# Patient Record
Sex: FEMALE | Race: WHITE | NOT HISPANIC OR LATINO
[De-identification: names, ages, dates, MRNs, and addresses within clinical notes are randomized per-mention and may not be internally consistent; named-entity substitution may affect disease eponyms.]

---

## 2017-01-17 ENCOUNTER — APPOINTMENT (OUTPATIENT)
Dept: OTOLARYNGOLOGY | Facility: CLINIC | Age: 77
End: 2017-01-17

## 2017-01-17 VITALS
HEIGHT: 67 IN | BODY MASS INDEX: 21.35 KG/M2 | HEART RATE: 76 BPM | WEIGHT: 136 LBS | DIASTOLIC BLOOD PRESSURE: 76 MMHG | SYSTOLIC BLOOD PRESSURE: 134 MMHG

## 2017-04-10 ENCOUNTER — APPOINTMENT (OUTPATIENT)
Dept: OTOLARYNGOLOGY | Facility: CLINIC | Age: 77
End: 2017-04-10

## 2017-04-10 VITALS
HEART RATE: 53 BPM | DIASTOLIC BLOOD PRESSURE: 78 MMHG | BODY MASS INDEX: 21.66 KG/M2 | SYSTOLIC BLOOD PRESSURE: 118 MMHG | HEIGHT: 67 IN | WEIGHT: 138 LBS

## 2017-06-21 ENCOUNTER — APPOINTMENT (OUTPATIENT)
Dept: OPHTHALMOLOGY | Facility: CLINIC | Age: 77
End: 2017-06-21

## 2017-08-08 ENCOUNTER — APPOINTMENT (OUTPATIENT)
Dept: OTOLARYNGOLOGY | Facility: CLINIC | Age: 77
End: 2017-08-08
Payer: MEDICARE

## 2017-08-08 VITALS
BODY MASS INDEX: 21.66 KG/M2 | SYSTOLIC BLOOD PRESSURE: 110 MMHG | DIASTOLIC BLOOD PRESSURE: 70 MMHG | WEIGHT: 138 LBS | HEIGHT: 67 IN

## 2017-08-08 PROCEDURE — 99214 OFFICE O/P EST MOD 30 MIN: CPT | Mod: 25

## 2017-08-08 PROCEDURE — 31231 NASAL ENDOSCOPY DX: CPT

## 2017-08-08 RX ORDER — FUROSEMIDE 20 MG/1
20 TABLET ORAL
Qty: 30 | Refills: 0 | Status: ACTIVE | COMMUNITY
Start: 2017-08-07

## 2017-08-08 RX ORDER — APIXABAN 5 MG/1
5 TABLET, FILM COATED ORAL
Qty: 60 | Refills: 0 | Status: ACTIVE | COMMUNITY
Start: 2017-08-05

## 2017-08-08 RX ORDER — ZOLPIDEM TARTRATE 6.25 MG/1
6.25 TABLET, EXTENDED RELEASE ORAL
Qty: 14 | Refills: 0 | Status: ACTIVE | COMMUNITY
Start: 2017-06-22

## 2017-08-08 RX ORDER — NITROFURANTOIN (MONOHYDRATE/MACROCRYSTALS) 25; 75 MG/1; MG/1
100 CAPSULE ORAL
Qty: 10 | Refills: 0 | Status: ACTIVE | COMMUNITY
Start: 2017-03-03

## 2017-11-07 ENCOUNTER — APPOINTMENT (OUTPATIENT)
Dept: OTOLARYNGOLOGY | Facility: CLINIC | Age: 77
End: 2017-11-07

## 2017-12-20 ENCOUNTER — APPOINTMENT (OUTPATIENT)
Dept: OPHTHALMOLOGY | Facility: CLINIC | Age: 77
End: 2017-12-20

## 2018-02-13 ENCOUNTER — APPOINTMENT (OUTPATIENT)
Dept: OTOLARYNGOLOGY | Facility: CLINIC | Age: 78
End: 2018-02-13
Payer: MEDICARE

## 2018-02-13 PROCEDURE — 31231 NASAL ENDOSCOPY DX: CPT

## 2018-02-13 PROCEDURE — 99213 OFFICE O/P EST LOW 20 MIN: CPT | Mod: 25

## 2018-02-13 RX ORDER — DILTIAZEM HYDROCHLORIDE 120 MG/1
120 CAPSULE, EXTENDED RELEASE ORAL
Qty: 14 | Refills: 0 | Status: ACTIVE | COMMUNITY
Start: 2017-12-19

## 2018-02-13 RX ORDER — METOPROLOL SUCCINATE 200 MG/1
200 TABLET, EXTENDED RELEASE ORAL
Qty: 30 | Refills: 0 | Status: ACTIVE | COMMUNITY
Start: 2018-01-17

## 2018-02-13 RX ORDER — CEPHALEXIN 250 MG/1
250 CAPSULE ORAL
Qty: 10 | Refills: 0 | Status: ACTIVE | COMMUNITY
Start: 2018-02-05

## 2018-02-13 RX ORDER — METHENAMINE HIPPURATE 1 G/1
1 TABLET ORAL
Qty: 100 | Refills: 0 | Status: ACTIVE | COMMUNITY
Start: 2018-01-05

## 2018-02-13 RX ORDER — CIPROFLOXACIN HYDROCHLORIDE 250 MG/1
250 TABLET, FILM COATED ORAL
Qty: 14 | Refills: 0 | Status: ACTIVE | COMMUNITY
Start: 2017-12-22

## 2018-02-13 RX ORDER — ESCITALOPRAM OXALATE 5 MG/1
5 TABLET ORAL
Qty: 10 | Refills: 0 | Status: ACTIVE | COMMUNITY
Start: 2018-02-06

## 2018-02-13 RX ORDER — BUSPIRONE HYDROCHLORIDE 5 MG/1
5 TABLET ORAL
Qty: 42 | Refills: 0 | Status: ACTIVE | COMMUNITY
Start: 2017-12-19

## 2018-02-20 ENCOUNTER — APPOINTMENT (OUTPATIENT)
Dept: OTOLARYNGOLOGY | Facility: CLINIC | Age: 78
End: 2018-02-20

## 2018-03-16 ENCOUNTER — APPOINTMENT (OUTPATIENT)
Dept: OTOLARYNGOLOGY | Facility: CLINIC | Age: 78
End: 2018-03-16
Payer: MEDICARE

## 2018-03-16 VITALS
DIASTOLIC BLOOD PRESSURE: 78 MMHG | HEIGHT: 67 IN | HEART RATE: 78 BPM | BODY MASS INDEX: 21.66 KG/M2 | WEIGHT: 138 LBS | SYSTOLIC BLOOD PRESSURE: 128 MMHG

## 2018-03-16 PROCEDURE — 31231 NASAL ENDOSCOPY DX: CPT

## 2018-03-16 PROCEDURE — 99214 OFFICE O/P EST MOD 30 MIN: CPT | Mod: 25

## 2018-05-18 ENCOUNTER — APPOINTMENT (OUTPATIENT)
Dept: OTOLARYNGOLOGY | Facility: CLINIC | Age: 78
End: 2018-05-18
Payer: MEDICARE

## 2018-05-18 VITALS
BODY MASS INDEX: 21.66 KG/M2 | HEART RATE: 85 BPM | WEIGHT: 138 LBS | SYSTOLIC BLOOD PRESSURE: 110 MMHG | HEIGHT: 67 IN | DIASTOLIC BLOOD PRESSURE: 61 MMHG

## 2018-05-18 PROCEDURE — 31231 NASAL ENDOSCOPY DX: CPT

## 2018-05-18 PROCEDURE — 99214 OFFICE O/P EST MOD 30 MIN: CPT | Mod: 25

## 2018-05-18 RX ORDER — ESCITALOPRAM OXALATE 10 MG/1
10 TABLET ORAL
Qty: 30 | Refills: 0 | Status: ACTIVE | COMMUNITY
Start: 2018-02-14

## 2018-08-10 ENCOUNTER — APPOINTMENT (OUTPATIENT)
Dept: OTOLARYNGOLOGY | Facility: CLINIC | Age: 78
End: 2018-08-10
Payer: MEDICARE

## 2018-08-10 VITALS
WEIGHT: 138 LBS | HEART RATE: 76 BPM | HEIGHT: 68 IN | SYSTOLIC BLOOD PRESSURE: 137 MMHG | BODY MASS INDEX: 20.92 KG/M2 | DIASTOLIC BLOOD PRESSURE: 80 MMHG

## 2018-08-10 PROCEDURE — 99213 OFFICE O/P EST LOW 20 MIN: CPT | Mod: 25

## 2018-08-10 PROCEDURE — 31231 NASAL ENDOSCOPY DX: CPT

## 2019-02-15 ENCOUNTER — APPOINTMENT (OUTPATIENT)
Dept: OTOLARYNGOLOGY | Facility: CLINIC | Age: 79
End: 2019-02-15
Payer: MEDICARE

## 2019-02-15 VITALS
WEIGHT: 136 LBS | DIASTOLIC BLOOD PRESSURE: 65 MMHG | SYSTOLIC BLOOD PRESSURE: 113 MMHG | HEART RATE: 76 BPM | BODY MASS INDEX: 20.61 KG/M2 | HEIGHT: 68 IN

## 2019-02-15 PROCEDURE — 31231 NASAL ENDOSCOPY DX: CPT

## 2019-02-15 PROCEDURE — 99214 OFFICE O/P EST MOD 30 MIN: CPT | Mod: 25

## 2019-02-15 NOTE — HISTORY OF PRESENT ILLNESS
[de-identified] : 78F here for routine f/u.\par \par She is doing well since last seen 6 months prior. No recent infections. She is not irrigating anymore, even though they helped her when she was on them.\par She had a cold last month and is still not back to normal yet, with mild congestion and sore throat.\par \par She takes mucinex to help with the mucus when she gets a cold or feels her chronic postnasal drainage getting thicker.\par \par She has had four prior endoscopic sinus surgeries - most recently in 2003. Since then, she has had no further surgeries and has done quite well. Her last sinus infection was nearly 2 years ago.\par \par ROS otherwise unchanged.

## 2019-02-15 NOTE — CONSULT LETTER
[Dear  ___] : Dear  [unfilled], [Courtesy Letter:] : I had the pleasure of seeing your patient, [unfilled], in my office today. [Consult Closing:] : Thank you very much for allowing me to participate in the care of this patient.  If you have any questions, please do not hesitate to contact me. [Sincerely,] : Sincerely, [DrAravind  ___] : Dr. GUIDO [Javi Knowles MD] : Javi Knowles MD  [Department of Otolaryngology, Head and Neck Surgery] : Department of Otolaryngology, Head and Neck Surgery [[facility]] : [facility]

## 2019-02-15 NOTE — PHYSICAL EXAM
[Nasal Endoscopy Performed] : nasal endoscopy was performed, see procedure section for findings [Midline] : trachea located in midline position [Normal] : no rashes [de-identified] : mild posterior oropharyngeal cobblestoning

## 2019-02-15 NOTE — PROCEDURE
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 fontanelle patent, but natural os scarred and not connected to fontanelle. ethmoids, frontal and sphenoid widely patent. no polyps, nasal airway patent. \par L nasal cavity: absent middle turbinate. no recirculation mucus; maxillary, ethmoid, frontal and sphenoid widely patent. inferior meatal antrostomy, no polyps. patent nasal airway.

## 2019-02-17 ENCOUNTER — TRANSCRIPTION ENCOUNTER (OUTPATIENT)
Age: 79
End: 2019-02-17

## 2019-05-21 ENCOUNTER — APPOINTMENT (OUTPATIENT)
Dept: OTOLARYNGOLOGY | Facility: CLINIC | Age: 79
End: 2019-05-21
Payer: MEDICARE

## 2019-05-21 VITALS
HEIGHT: 68 IN | BODY MASS INDEX: 21.52 KG/M2 | HEART RATE: 70 BPM | DIASTOLIC BLOOD PRESSURE: 68 MMHG | WEIGHT: 142 LBS | SYSTOLIC BLOOD PRESSURE: 126 MMHG

## 2019-05-21 PROCEDURE — 99214 OFFICE O/P EST MOD 30 MIN: CPT | Mod: 25

## 2019-05-21 PROCEDURE — 31231 NASAL ENDOSCOPY DX: CPT

## 2019-05-21 NOTE — HISTORY OF PRESENT ILLNESS
[de-identified] : 78F here for routine f/u.\par \par Since last seen three months ago, she feels a bit congested, but is otherwise doing well. No recent infections. She is not irrigating regularly anymore, even though they helped her when she was on them.\par She is around lots of dust since she is sorting through very old belongings and does not wear a mask.\par \par She takes mucinex to help with the mucus when she gets a cold or feels her chronic postnasal drainage getting thicker.\par \par She has had four prior endoscopic sinus surgeries - most recently in 2003. Since then, she has had no further surgeries and has done quite well. Her last sinus infection was nearly 2 years ago.\par \par ROS otherwise unchanged.

## 2019-05-21 NOTE — PROCEDURE
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 fontanelle patent, but natural os scarred and not connected to fontanelle. ethmoids, frontal and sphenoid widely patent. no polyps, nasal airway patent. \par L nasal cavity: absent middle turbinate. scant recirculation mucus; maxillary, ethmoid, frontal and sphenoid widely patent. inferior meatal antrostomy, no polyps. patent nasal airway.

## 2019-05-21 NOTE — ASSESSMENT
[FreeTextEntry1] : 78F here for routine f/u. Since last seen three months ago, she feels a bit congested, but is otherwise doing well. No recent infections. She is not irrigating regularly anymore, even though they helped her when she was on them.\par She has been around lots of dust since she is sorting through very old belongings and does not wear a mask.\par She has h/o chronic sinusitis without polyposis s/p 4 prior endoscopic sinus surgeries in past, most recently 2003. She has been using the neilmed irrigations more often and she feels they are helping - there is less postnasal drip and mucus. On endoscopy, there is a mild amount of clear thin recirculation mucus from behind both uncinates, which was suctioned w relief. There is minimal crusting in the right nasal cavity and dried mucosa, but there is no mucopus or polyps, nor any sinonasal mucosal edema; all paranasal sinuses are patent.\par This mucus buildup is constantly draining posteriorly causing her sx and is due to residual uncinates, prior surgery and scar blocking the natural maxillary os from the surgical antrostomy, despite very widely physically patent maxillary sinuses. This causes her URIs to last longer and persist and also likely causes increased sensitivity to dust and other particulates in the air.\par It was again stressed the need to be on regular neilmed rinses; this is the only way to clear the thick mucus and remove the nidus for infection. Also should use bacitracin to nasal vestibule twice daily for the dryness. There is no endoscopic e/o sinusitis today. RTO 4-6 months for routine drainage, or sooner should anything develop in the interim. Pt reassured.

## 2019-05-21 NOTE — CONSULT LETTER
[Dear  ___] : Dear  [unfilled], [Courtesy Letter:] : I had the pleasure of seeing your patient, [unfilled], in my office today. [Consult Closing:] : Thank you very much for allowing me to participate in the care of this patient.  If you have any questions, please do not hesitate to contact me. [Sincerely,] : Sincerely, [DrAravind  ___] : Dr. GUIDO [Javi Knowles MD] : Javi Knowles MD  [Department of Otolaryngology, Head and Neck Surgery] : Department of Otolaryngology, Head and Neck Surgery [[facility]] : [facility]

## 2019-05-21 NOTE — PHYSICAL EXAM
[Nasal Endoscopy Performed] : nasal endoscopy was performed, see procedure section for findings [Midline] : trachea located in midline position [de-identified] : mild posterior oropharyngeal cobblestoning [Normal] : no rashes

## 2019-08-02 ENCOUNTER — APPOINTMENT (OUTPATIENT)
Dept: OTOLARYNGOLOGY | Facility: CLINIC | Age: 79
End: 2019-08-02
Payer: MEDICARE

## 2019-08-02 VITALS
WEIGHT: 150 LBS | HEIGHT: 68 IN | BODY MASS INDEX: 22.73 KG/M2 | SYSTOLIC BLOOD PRESSURE: 128 MMHG | DIASTOLIC BLOOD PRESSURE: 76 MMHG | HEART RATE: 74 BPM

## 2019-08-02 PROCEDURE — 99214 OFFICE O/P EST MOD 30 MIN: CPT | Mod: 25

## 2019-08-02 PROCEDURE — 31231 NASAL ENDOSCOPY DX: CPT

## 2019-08-02 RX ORDER — METHYLPREDNISOLONE 4 MG/1
4 TABLET ORAL
Qty: 1 | Refills: 2 | Status: ACTIVE | COMMUNITY
Start: 2019-08-02 | End: 1900-01-01

## 2019-08-05 NOTE — CONSULT LETTER
[Dear  ___] : Dear  [unfilled], [Courtesy Letter:] : I had the pleasure of seeing your patient, [unfilled], in my office today. [Consult Closing:] : Thank you very much for allowing me to participate in the care of this patient.  If you have any questions, please do not hesitate to contact me. [Sincerely,] : Sincerely, [DrAravind  ___] : Dr. GUIDO [Department of Otolaryngology, Head and Neck Surgery] : Department of Otolaryngology, Head and Neck Surgery [Javi Knowles MD] : Javi Knowles MD  [[facility]] : [facility]

## 2019-08-05 NOTE — HISTORY OF PRESENT ILLNESS
[de-identified] : 79F here in f/u.\par \par For the past 5 days, she c/o diminished right sided hearing with right ear fullness. She has worn hearing aids for years. but over this time, the aid is not working like it normally does.\par There is no otorrhea, otalgia, tinnitus or vertigo.\par \par Other than the ear, since last seen 2-3 months ago, she is otherwise doing alright with no recent infections. She is not irrigating regularly. \par She is around lots of dust since she is sorting through very old belongings and does not wear a mask.\par \par She takes mucinex to help with the mucus when she gets a cold or feels her chronic postnasal drainage getting thicker.\par \par She has had four prior endoscopic sinus surgeries - most recently in 2003. Since then, she has had no further surgeries and has done quite well. Her last sinus infection was nearly 2 years ago.\par \par ROS otherwise unchanged.

## 2019-08-05 NOTE — ASSESSMENT
[FreeTextEntry1] : 79F here in f/u. For the past 5 days, she c/o diminished right sided hearing with right ear fullness. She has worn hearing aids for years. but over this time, the aid is not working like it normally does. There is no otorrhea, otalgia, tinnitus or vertigo. Other than the ear, since last seen 2-3 months ago, she is otherwise doing alright with no recent infections. She is not irrigating regularly and has increased mucus. She has been around lots of dust since she is sorting through very old belongings and does not wear a mask.\par She has h/o chronic sinusitis without polyposis s/p 4 prior endoscopic sinus surgeries in past, most recently 2003. She has been using the neilmed irrigations only rarely. There is a right sided serous middle ear effusion. On endoscopy, there is a moderate bilateral recirculation mucus from behind both uncinates, which was suctioned w relief; there is mild nonobstructive left ethmoid mucosal edema, but there is no mucopus or polyps, nor any other sinonasal mucosal edema and all paranasal sinuses are patent.\par Right hearing is muffled due to serous otitis media. Will start medrol taper to help facilitate middle ear aeration. Advised to start flonase and do neilmed rinses more regularly.\par \par The mucus buildup is constantly draining posteriorly causing her sx and is due to residual uncinates, prior surgery and scar blocking the natural maxillary os from the surgical antrostomy, despite very widely physically patent maxillary sinuses. This causes her URIs to last longer and persist and also likely causes increased sensitivity to dust and other particulates in the air. It was again stressed the need to be on regular neilmed rinses; this is the only way to clear the thick mucus and remove the nidus for infection. She will me know how she is doing over the next 2 weeks or so.

## 2019-08-05 NOTE — PHYSICAL EXAM
[Nasal Endoscopy Performed] : nasal endoscopy was performed, see procedure section for findings [FreeTextEntry1] : Ad: eac clear and dry, TM intact, immobile w anatoly serous effusion\par As: eac clear and dry, TM intact and mobile, ME clear [de-identified] : mild posterior oropharyngeal cobblestoning [Midline] : trachea located in midline position [Normal] : no rashes

## 2019-08-05 NOTE — PROCEDURE
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 fontanelle patent, but natural os scarred and not connected to fontanelle. ethmoids, frontal and sphenoid widely patent. no polyps, nasal airway patent. \par L NC: absent middle turbinate. moderate recirculation mucus; maxillary, ethmoid, frontal and sphenoid widely patent, mild nonobstructive ethmoid edema. inferior meatal antrostomy, no polyps. patent nasal airway.

## 2019-09-13 ENCOUNTER — APPOINTMENT (OUTPATIENT)
Dept: OTOLARYNGOLOGY | Facility: CLINIC | Age: 79
End: 2019-09-13
Payer: MEDICARE

## 2019-09-13 VITALS
DIASTOLIC BLOOD PRESSURE: 80 MMHG | HEIGHT: 68 IN | WEIGHT: 150 LBS | SYSTOLIC BLOOD PRESSURE: 112 MMHG | BODY MASS INDEX: 22.73 KG/M2 | HEART RATE: 94 BPM

## 2019-09-13 PROCEDURE — 31231 NASAL ENDOSCOPY DX: CPT

## 2019-09-13 PROCEDURE — 99214 OFFICE O/P EST MOD 30 MIN: CPT | Mod: 25

## 2019-09-13 NOTE — CONSULT LETTER
[Dear  ___] : Dear  [unfilled], [Courtesy Letter:] : I had the pleasure of seeing your patient, [unfilled], in my office today. [Consult Closing:] : Thank you very much for allowing me to participate in the care of this patient.  If you have any questions, please do not hesitate to contact me. [Sincerely,] : Sincerely, [DrAravind  ___] : Dr. GUIDO [Javi Knowles MD] : Javi Knowles MD  [Department of Otolaryngology, Head and Neck Surgery] : Department of Otolaryngology, Head and Neck Surgery [[facility]] : [facility]

## 2019-09-13 NOTE — HISTORY OF PRESENT ILLNESS
[de-identified] : 79F here in f/u.\par \par Since last seen 5 weeks ago, she is doing well. However, there is still right ear fullness. She took the medrol taper as recommended at last visit, when found to have right mucoid otitis media. She has worn hearing aids for years, but over this time, the aid is not working like it normally does and feels distant and thinks sound like she is in an 'echo chamber.'\par There is no otorrhea, otalgia, tinnitus or vertigo.\par \par Other than the ear, she is otherwise doing alright with no recent sinus infections. She is irrigating several times weekly. \par She is around lots of dust since she is sorting through very old belongings and does not wear a mask.\par \par She takes mucinex to help with the mucus when she gets a cold or feels her chronic postnasal drainage getting thicker.\par \par She has had four prior endoscopic sinus surgeries - most recently in 2003. Since then, she has had no further surgeries and has done quite well. Her last sinus infection was nearly 2 years ago.\par \par ROS otherwise unchanged.

## 2019-09-13 NOTE — ASSESSMENT
[FreeTextEntry1] : 79F here in f/u. Since last seen 5 weeks ago, she is doing well. However, there is still right ear fullness. She took the medrol taper as recommended at last visit, but it did not help. There is no otorrhea, otalgia, tinnitus or vertigo. Other than the ear, she is otherwise doing alright with no recent sinus infections. She is irrigating several times weekly. She has been around lots of dust since she is sorting through very old belongings and does not wear a mask.\par She has h/o chronic sinusitis without polyposis s/p 4 prior endoscopic sinus surgeries in past, most recently 2003. She has been using the neilmed irrigations only rarely. There is a right sided serous middle ear effusion. On endoscopy, there is a mild left sided recirculation mucus from behind the uncinate, which was suctioned w relief; there is no mucopus or polyps, nor any other sinonasal mucosal edema and all paranasal sinuses are patent.\par Right hearing is muffled due to persistent serous otitis media. Will try additional medrol taper to help facilitate middle ear aeration. Advised to start flonase and do neilmed rinses more regularly. RTO 8 weeks. Should right ear effusion remain, will need myringotomy; this was all discussed at length and all questions answered.\par \par The mucus buildup is constantly draining posteriorly causing her sx and is due to residual uncinates, prior surgery and scar blocking the natural maxillary os from the surgical antrostomy, despite very widely physically patent maxillary sinuses. This causes her URIs to last longer and persist and also likely causes increased sensitivity to dust and other particulates in the air. It was again stressed the need to be on regular neilmed rinses; this is the only way to clear the thick mucus and remove the nidus for infection. She will me know how she is doing over the next 2 weeks or so.

## 2019-09-13 NOTE — PHYSICAL EXAM
[Nasal Endoscopy Performed] : nasal endoscopy was performed, see procedure section for findings [Midline] : trachea located in midline position [de-identified] : mild posterior oropharyngeal cobblestoning [Normal] : no rashes [FreeTextEntry1] : Ad: eac clear and dry, TM intact, immobile and dull with serous effusion\par As: eac clear and dry, TM intact and mobile, ME clear

## 2019-09-13 NOTE — PROCEDURE
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 fontanelle patent, but natural os scarred and not connected to fontanelle. ethmoids, frontal and sphenoid widely patent. no polyps, nasal airway patent. \par L NC: absent middle turbinate. mild recirculation mucus; maxillary, ethmoid, frontal and sphenoid widely patent, mild nonobstructive ethmoid edema. inferior meatal antrostomy, no polyps. patent nasal airway. \par ETO patent b/l, choana clear

## 2019-11-08 ENCOUNTER — APPOINTMENT (OUTPATIENT)
Dept: OTOLARYNGOLOGY | Facility: CLINIC | Age: 79
End: 2019-11-08
Payer: MEDICARE

## 2019-11-08 VITALS
BODY MASS INDEX: 27.28 KG/M2 | DIASTOLIC BLOOD PRESSURE: 68 MMHG | HEART RATE: 63 BPM | HEIGHT: 68 IN | WEIGHT: 180 LBS | SYSTOLIC BLOOD PRESSURE: 100 MMHG

## 2019-11-08 PROCEDURE — 92550 TYMPANOMETRY & REFLEX THRESH: CPT

## 2019-11-08 PROCEDURE — 92557 COMPREHENSIVE HEARING TEST: CPT

## 2019-11-08 PROCEDURE — 69420 INCISION OF EARDRUM: CPT

## 2019-11-08 PROCEDURE — 31231 NASAL ENDOSCOPY DX: CPT

## 2019-11-08 PROCEDURE — 99213 OFFICE O/P EST LOW 20 MIN: CPT | Mod: 25

## 2019-11-08 RX ORDER — CIPROFLOXACIN AND DEXAMETHASONE 3; 1 MG/ML; MG/ML
0.3-0.1 SUSPENSION/ DROPS AURICULAR (OTIC)
Qty: 1 | Refills: 0 | Status: ACTIVE | COMMUNITY
Start: 2019-11-08 | End: 1900-01-01

## 2019-11-08 NOTE — ASSESSMENT
[FreeTextEntry1] : 79F here in followup. Since last seen 5 weeks ago, her sx remain unchanged; there is still right ear fullness and discomfort. The medrol taper did not help. She has worn hearing aids for years, but over this time, the aid is not working like it normally does and feels distant and thinks sound like she is in an 'echo chamber.' There is no otorrhea, otalgia, tinnitus or vertigo. 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 \par Other than the ear, she is otherwise doing alright with no recent sinus infections. She is irrigating several times weekly. She has been around lots of dust since she is sorting through very old belongings and does not wear a mask.\par She has h/o chronic sinusitis without polyposis s/p 4 prior endoscopic sinus surgeries in past, most recently 2003. She has been using the neilmed irrigations occasionally. \par On exam, there is a right sided serous effusion. On endoscopy, there is a mild left sided recirculation mucus from behind the uncinate, which was suctioned w relief; there is no mucopus or polyps, nor any other sinonasal mucosal edema and all paranasal sinuses are patent.\par Right hearing is muffled due to persistent serous otitis media, unimproved after oral steroids and time. There is substantial conductive component to hearing loss on audiogram from today. Right myringotomy was done today and the serous effusion was evacuated with immediate improvement in hearing. For now, ciprodex to right ear for three days. RTO 3 weeks for reevaluation. Should the fluid reaccumulate, will need a tube. This was all discussed at length.\par \par The mucus buildup is constantly draining posteriorly causing her sx and is due to residual uncinates, prior surgery and scar blocking the natural maxillary os from the surgical antrostomy, despite very widely physically patent maxillary sinuses. This causes her URIs to last longer and persist and also likely causes increased sensitivity to dust and other particulates in the air. It was again stressed the need to be on regular neilmed rinses; this is the only way to clear the thick mucus and remove the nidus for infection.

## 2019-11-08 NOTE — CONSULT LETTER
[Dear  ___] : Dear  [unfilled], [Courtesy Letter:] : I had the pleasure of seeing your patient, [unfilled], in my office today. [Consult Closing:] : Thank you very much for allowing me to participate in the care of this patient.  If you have any questions, please do not hesitate to contact me. [Sincerely,] : Sincerely, [DrAravind  ___] : Dr. GUIDO [Javi Knowles MD] : Javi Knowles MD  [Department of Otolaryngology, Head and Neck Surgery] : Department of Otolaryngology, Head and Neck Surgery [[facility]] : [facility]

## 2019-11-08 NOTE — PROCEDURE
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 fontanelle patent, but natural os scarred and not connected to fontanelle. ethmoids, frontal and sphenoid widely patent. no polyps, nasal airway patent. \par L NC: absent middle turbinate. mild white recirculation mucus removed; maxillary, ethmoid, frontal and sphenoid widely patent, mild nonobstructive ethmoid edema. inferior meatal antrostomy, no polyps. patent nasal airway. \par ETO patent b/l, choana clear\par \par Right Myringotomy:\par informed consent obtained\par right TM topicalized w phenol\par myringotomy made posteroinferiorly w evacuation of serous debris\par hearing immediately improved, tolerated well, no vertigo

## 2019-11-08 NOTE — HISTORY OF PRESENT ILLNESS
[de-identified] : 79F here in followup.\par \par Since last seen 5 weeks ago, her sx remain unchanged; there is still right ear fullness and discomfort. \par The medrol taper did not help. 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 SRT 65, 100% discrim, type B\par L ear: moderate SNHL. SRT 45, 100% discrim, type A\par \par Other than the ear, she is otherwise doing alright with no recent sinus infections. There is mild congestion. She is irrigating several times weekly. She is around lots of dust since she is sorting through very old belongings and does not wear a mask.\par \par She has had four prior endoscopic sinus surgeries - most recently in 2003. Since then, she has had no further surgeries and has done quite well. Her last sinus infection was nearly 2 years ago.\par \par ROS otherwise unchanged.

## 2019-11-08 NOTE — PHYSICAL EXAM
[Nasal Endoscopy Performed] : nasal endoscopy was performed, see procedure section for findings [Midline] : trachea located in midline position [Normal] : orientation to person, place, and time: normal [de-identified] : mild posterior oropharyngeal cobblestoning [FreeTextEntry1] : Ad: eac clear and dry, TM intact, immobile and dull with serous effusion\par As: eac clear and dry, TM intact and mobile, ME clear

## 2019-11-26 ENCOUNTER — APPOINTMENT (OUTPATIENT)
Dept: OTOLARYNGOLOGY | Facility: CLINIC | Age: 79
End: 2019-11-26
Payer: MEDICARE

## 2019-11-26 VITALS
BODY MASS INDEX: 22.28 KG/M2 | HEART RATE: 68 BPM | WEIGHT: 147 LBS | DIASTOLIC BLOOD PRESSURE: 73 MMHG | SYSTOLIC BLOOD PRESSURE: 131 MMHG | HEIGHT: 68 IN

## 2019-11-26 PROCEDURE — 99214 OFFICE O/P EST MOD 30 MIN: CPT | Mod: 25

## 2019-11-26 PROCEDURE — 31231 NASAL ENDOSCOPY DX: CPT

## 2019-11-26 NOTE — PROCEDURE
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 fontanelle patent, but natural os scarred and not connected to fontanelle. ethmoids, frontal and sphenoid widely patent. no polyps, nasal airway patent. \par L NC: absent middle turbinate. mild recirculation mucus removed; maxillary, ethmoid, frontal and sphenoid widely patent, mild nonobstructive ethmoid edema. inferior meatal antrostomy, no polyps. patent nasal airway. \par ETO patent b/l, choana clear

## 2019-11-26 NOTE — ASSESSMENT
[FreeTextEntry1] : 79F here in followup. Since last seen nearly 3 weeks, she thinks the right sided hearing has improved and there is less right ear fullness and discomfort. 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 There is no otorrhea, otalgia, tinnitus or vertigo.\par Other than the ear, she is otherwise doing alright with no recent sinus infections. She is irrigating several times weekly. She has been around lots of dust since she is sorting through very old belongings and does not wear a mask. She has h/o chronic sinusitis without polyposis s/p 4 prior endoscopic sinus surgeries in past, most recently 2003. She has been using the neilmed irrigations occasionally. \par On exam, there remains a right sided thin serous effusion. On endoscopy, there is a mild bilateral recirculation mucus from behind the uncinate, which was suctioned w relief; there is no mucopus or polyps and all paranasal sinuses are patent.\par There remains a persistent serous otitis media, unimproved after myringotomy and also oral steroids. It has remained now for nearly 3 months. The next step would be myringotomy w ear tube placement for definitive treatment. This was discussed and she would like to try another course of oral steroids. I recommended proceeding with ear tube placement, but she wants to try another short course oral steroid taper and see if sx improve. RTO 2-3 weeks. If effusion remains, will proceed w M&T.

## 2019-11-26 NOTE — CONSULT LETTER
[Dear  ___] : Dear  [unfilled], [Courtesy Letter:] : I had the pleasure of seeing your patient, [unfilled], in my office today. [Consult Closing:] : Thank you very much for allowing me to participate in the care of this patient.  If you have any questions, please do not hesitate to contact me. [Sincerely,] : Sincerely, [DrAravind  ___] : Dr. GUIDO [Javi Knowles MD] : Javi Knowles MD  [Department of Otolaryngology, Head and Neck Surgery] : Department of Otolaryngology, Head and Neck Surgery [[facility]] : [facility]

## 2019-11-26 NOTE — HISTORY OF PRESENT ILLNESS
[de-identified] : 79F here in followup.\par \par Since last seen nearly 3 weeks, she thinks the right sided hearing has improved and there is less right ear fullness and discomfort. At last visit, underwent right myringotomy for persistent serous effusion w conductive hearing loss.\par There is no otorrhea, otalgia, tinnitus or vertigo.\par \par Other than the ear, she is otherwise doing alright with no recent sinus infections. There is mild congestion. She is irrigating several times weekly. She is around lots of dust since she is sorting through very old belongings and does not wear a mask.\par \par She has had four prior endoscopic sinus surgeries - most recently in 2003. Since then, she has had no further surgeries and has done quite well. Her last sinus infection was nearly 2 years ago.\par \par ROS otherwise unchanged.

## 2019-11-26 NOTE — PHYSICAL EXAM
[Nasal Endoscopy Performed] : nasal endoscopy was performed, see procedure section for findings [Midline] : trachea located in midline position [de-identified] : mild posterior oropharyngeal cobblestoning [Normal] : no rashes [FreeTextEntry1] : Ad: eac clear and dry, TM intact, immobile with thin serous effusion. posterior TM w mild erythema\par As: eac clear and dry, TM intact and mobile, ME clear

## 2019-11-26 NOTE — DATA REVIEWED
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 SRT 65, 100% discrim, type B\par L ear: moderate SNHL. SRT 45, 100% discrim, type A\par

## 2019-12-27 ENCOUNTER — APPOINTMENT (OUTPATIENT)
Dept: OTOLARYNGOLOGY | Facility: CLINIC | Age: 79
End: 2019-12-27
Payer: MEDICARE

## 2019-12-27 VITALS
HEIGHT: 68 IN | SYSTOLIC BLOOD PRESSURE: 118 MMHG | DIASTOLIC BLOOD PRESSURE: 68 MMHG | HEART RATE: 63 BPM | WEIGHT: 151 LBS | BODY MASS INDEX: 22.88 KG/M2

## 2019-12-27 PROCEDURE — 99214 OFFICE O/P EST MOD 30 MIN: CPT | Mod: 25

## 2019-12-27 PROCEDURE — 31231 NASAL ENDOSCOPY DX: CPT

## 2019-12-27 NOTE — CONSULT LETTER
[Dear  ___] : Dear  [unfilled], [Courtesy Letter:] : I had the pleasure of seeing your patient, [unfilled], in my office today. [Consult Closing:] : Thank you very much for allowing me to participate in the care of this patient.  If you have any questions, please do not hesitate to contact me. [DrAravind  ___] : Dr. GUIDO [Sincerely,] : Sincerely, [Department of Otolaryngology, Head and Neck Surgery] : Department of Otolaryngology, Head and Neck Surgery [Javi Knowles MD] : Javi Knowles MD  [[facility]] : [facility]

## 2019-12-27 NOTE — PROCEDURE
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 yellow mucus w crust removed from nasal airway/antrum. fontanelle patent, but natural os scarred and not connected to fontanelle. ethmoids, frontal and sphenoid widely patent. no polyps, mild mucosal edema\par L NC: absent middle turbinate. mild recirculation mucus removed; maxillary, ethmoid, frontal and sphenoid widely patent, mild nonobstructive ethmoid edema and also posterior antral edema. inferior meatal antrostomy, no polyps. patent nasal airway. \par ETO patent b/l, choana clear

## 2019-12-27 NOTE — HISTORY OF PRESENT ILLNESS
[de-identified] : 79F here in followup.\par \par Since last seen one month prior, she feels the right sided hearing remains slightly muffled. There is no discomfort. \par On 11/8/19 she underwent right myringotomy for serous effusion in the setting of conductive hearing loss.\par There is no otorrhea, otalgia, tinnitus or vertigo.\par \par Other than the ear, she is otherwise doing alright with no recent sinus infections. There is mild congestion and she thinks she has a cold. She is not really irrigating consistently. \par She is around lots of dust since she is sorting through very old belongings and does not wear a mask.\par \par She has had four prior endoscopic sinus surgeries - most recently in 2003. Since then, she has had no further surgeries and has done quite well. Her last sinus infection was nearly 2 years ago.\par \par ROS otherwise unchanged.

## 2019-12-27 NOTE — ASSESSMENT
[FreeTextEntry1] : 79F here in followup. Since last seen one month prior, she feels the right sided hearing remains slightly muffled. There is no discomfort. On 11/8/19 she underwent right myringotomy for serous effusion in the setting of conductive hearing loss but on most recent visit the effusion had returned. There is no otorrhea, otalgia, tinnitus or vertigo. Other than the ear, she is otherwise doing alright with no recent sinus infections. There is mild congestion and she thinks she has a cold. She is not really irrigating consistently. \par She has h/o chronic sinusitis without polyposis s/p 4 prior endoscopic sinus surgeries in past, most recently 2003. On exam, the right TM is immobile and there remains a serous effusion. On endoscopy, there is a mild bilateral recirculation mucus from behind the uncinate, which was suctioned w relief; there is mild ethmoid edema and also yellow mucus/scab in the right antrum, which was removed. Otherwise, there is no mucopus or polyps and all paranasal sinuses are patent.\par There remains a persistent serous otitis media, unimproved after myringotomy and two courses of oral steroids. It has remained now for nearly 4 months. The next step would be myringotomy w ear tube placement for definitive treatment. We were planning on proceeding with it today, but she has a cold and will hold off until that passes and plan to proceed in 3 weeks. Should effusion remain at that time, will proceed w M&T. In the meantime, advised to do flonase daily and more consistent saline rinses.

## 2019-12-27 NOTE — DATA REVIEWED
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 SRT 65, 100% discrim, type B\par L ear: moderate SNHL. SRT 45, 100% discrim, type A\par

## 2019-12-27 NOTE — PHYSICAL EXAM
[Nasal Endoscopy Performed] : nasal endoscopy was performed, see procedure section for findings [Midline] : trachea located in midline position [de-identified] : mild posterior oropharyngeal cobblestoning [Normal] : no rashes [FreeTextEntry1] : Ad: eac clear and dry, TM intact, immobile with thin serous effusion. posterior TM w mild erythema\par As: eac clear and dry, TM intact and mobile, ME clear

## 2020-01-21 ENCOUNTER — APPOINTMENT (OUTPATIENT)
Dept: OTOLARYNGOLOGY | Facility: CLINIC | Age: 80
End: 2020-01-21
Payer: MEDICARE

## 2020-01-21 VITALS
BODY MASS INDEX: 22.58 KG/M2 | SYSTOLIC BLOOD PRESSURE: 116 MMHG | DIASTOLIC BLOOD PRESSURE: 67 MMHG | HEART RATE: 69 BPM | HEIGHT: 68 IN | WEIGHT: 149 LBS

## 2020-01-21 PROCEDURE — 69433 CREATE EARDRUM OPENING: CPT

## 2020-01-21 PROCEDURE — 99212 OFFICE O/P EST SF 10 MIN: CPT | Mod: 25

## 2020-01-21 RX ORDER — CIPROFLOXACIN AND DEXAMETHASONE 3; 1 MG/ML; MG/ML
0.3-0.1 SUSPENSION/ DROPS AURICULAR (OTIC)
Qty: 1 | Refills: 0 | Status: ACTIVE | COMMUNITY
Start: 2020-01-21 | End: 1900-01-01

## 2020-01-21 NOTE — DATA REVIEWED
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 SRT 65, 100% discrim, type B\par L ear: moderate SNHL. SRT 45, 100% discrim, type A\par

## 2020-01-21 NOTE — HISTORY OF PRESENT ILLNESS
[de-identified] : 79F here in followup.\par \par Since last seen one month prior, she feels the right sided hearing remains slightly muffled. There is no discomfort. \par On 11/8/19 she underwent right myringotomy for serous effusion in the setting of conductive hearing loss, but at last visit the effusion had reaccumulated.\par There is no otorrhea, otalgia, tinnitus or vertigo.\par \par Other than the ear, she is otherwise doing alright with no recent sinus infections. There is mild congestion, but improved since last time. She is not really irrigating consistently. \par She is around lots of dust since she is sorting through very old belongings and does not wear a mask.\par \par She has had four prior endoscopic sinus surgeries - most recently in 2003. Since then, she has had no further surgeries and has done quite well. Her last sinus infection was nearly 2 years ago.\par \par ROS otherwise unchanged.

## 2020-01-21 NOTE — PROCEDURE
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 yellow mucus w crust removed from nasal airway/antrum. fontanelle patent, but natural os scarred and not connected to fontanelle. ethmoids, frontal and sphenoid widely patent. no polyps, mild mucosal edema\par L NC: absent middle turbinate. mild recirculation mucus removed; maxillary, ethmoid, frontal and sphenoid widely patent, mild nonobstructive ethmoid edema and also posterior antral edema. inferior meatal antrostomy, no polyps. patent nasal airway. \par ETO patent b/l, choana clear\par \par Right Myringotomy w Tube:\par informed consent obtained\par right TM topicalized w phenol\par myringotomy made inferiorly, serous fluid suctioned w immediate improvement in hearing\par papparella type A tube place in good position, patent\par tolerated well

## 2020-01-21 NOTE — CONSULT LETTER
[Courtesy Letter:] : I had the pleasure of seeing your patient, [unfilled], in my office today. [Dear  ___] : Dear  [unfilled], [Consult Closing:] : Thank you very much for allowing me to participate in the care of this patient.  If you have any questions, please do not hesitate to contact me. [Sincerely,] : Sincerely, [DrAravind  ___] : Dr. GUIDO [Javi Knowles MD] : Javi Knowles MD  [[facility]] : [facility] [Department of Otolaryngology, Head and Neck Surgery] : Department of Otolaryngology, Head and Neck Surgery

## 2020-01-21 NOTE — ASSESSMENT
[FreeTextEntry1] : 79F here in followup. Since last seen one month prior, she feels the right sided hearing remains slightly muffled. There is no discomfort. On 11/8/19 she underwent right myringotomy for serous effusion in the setting of conductive hearing loss but on last visit the effusion had returned. There is no otorrhea, otalgia, tinnitus or vertigo. Other than the ear, she is otherwise doing alright with no recent sinus infections. There is mild congestion. She is not really irrigating consistently. \par She has h/o chronic sinusitis without polyposis s/p 4 prior endoscopic sinus surgeries in past, most recently 2003. On exam, the right TM is immobile and there remains a serous effusion. Right myringotomy and tube placement were done without issue.\par To use ciprodex to right ear for next 3 days. Avoid using hearing aid for next 2 weeks. RTO 3 weeks for wound check and repeat audiogram.

## 2020-01-21 NOTE — PHYSICAL EXAM
[Nasal Endoscopy Performed] : nasal endoscopy was performed, see procedure section for findings [Midline] : trachea located in midline position [de-identified] : mild posterior oropharyngeal cobblestoning [Normal] : no rashes [FreeTextEntry1] : Ad: eac clear and dry, TM intact, immobile with thin serous effusion. posterior TM w mild erythema\par As: eac clear and dry, TM intact and mobile, ME clear

## 2020-02-10 ENCOUNTER — APPOINTMENT (OUTPATIENT)
Dept: OTOLARYNGOLOGY | Facility: CLINIC | Age: 80
End: 2020-02-10
Payer: MEDICARE

## 2020-02-10 VITALS
DIASTOLIC BLOOD PRESSURE: 77 MMHG | SYSTOLIC BLOOD PRESSURE: 124 MMHG | WEIGHT: 149 LBS | HEART RATE: 87 BPM | BODY MASS INDEX: 22.58 KG/M2 | HEIGHT: 68 IN

## 2020-02-10 PROCEDURE — 31231 NASAL ENDOSCOPY DX: CPT

## 2020-02-10 PROCEDURE — 99214 OFFICE O/P EST MOD 30 MIN: CPT | Mod: 25

## 2020-02-10 NOTE — CONSULT LETTER
[Dear  ___] : Dear  [unfilled], [Courtesy Letter:] : I had the pleasure of seeing your patient, [unfilled], in my office today. [Consult Closing:] : Thank you very much for allowing me to participate in the care of this patient.  If you have any questions, please do not hesitate to contact me. [Sincerely,] : Sincerely, [DrAravind  ___] : Dr. GUIDO [Javi Knowles MD] : Javi Knowles MD  [Department of Otolaryngology, Head and Neck Surgery] : Department of Otolaryngology, Head and Neck Surgery [[facility]] : [facility]

## 2020-02-10 NOTE — DATA REVIEWED
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 SRT 65, 100% discrim, type B\par L ear: moderate SNHL. SRT 45, 100% discrim, type A\par

## 2020-02-10 NOTE — PHYSICAL EXAM
[Nasal Endoscopy Performed] : nasal endoscopy was performed, see procedure section for findings [Midline] : trachea located in midline position [de-identified] : mild posterior oropharyngeal cobblestoning [Normal] : no rashes [FreeTextEntry1] : Ad: eac clear and dry, PE tube in place and patent, ME clear\par As: eac clear and dry, TM intact and mobile, ME clear

## 2020-02-10 NOTE — HISTORY OF PRESENT ILLNESS
[de-identified] : 79F here in followup.\par \par At prior visit 1/21/20 she underwent right M&T placement and has done well since. Since then the right sided hearing remains improved and there is no drainage. Otherwise, she has mild congestion and thinks she may have a cold; she has not been irrigating despite recommendation.\par \par On 11/8/19 she underwent right myringotomy for serous effusion in the setting of conductive hearing loss, but the effusion had reaccumulated.\par \par She has had four prior endoscopic sinus surgeries - most recently in 2003. Since then, she has had no further surgeries and has done quite well. Her last sinus infection was nearly 2 years ago.\par \par ROS otherwise unchanged.

## 2020-02-10 NOTE — PROCEDURE
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 fontanelle patent, but natural os scarred and not connected to fontanelle. ethmoids, frontal and sphenoid widely patent. no polyps, scattered clear mucus\par L NC: absent middle turbinate. thick yellow mucus in maxillary suctioned free; ethmoid, frontal and sphenoid widely patent, mild nonobstructive ethmoid edema and also posterior antral edema. inferior meatal antrostomy, no polyps. patent nasal airway. \par ETO patent b/l, choana clear

## 2020-02-10 NOTE — ASSESSMENT
[FreeTextEntry1] : 79F here in followup. At prior visit 3 weeks ago she underwent right PE tube placement and has done well since - the right sided hearing remains improved and there is no otorrhea, otalgia, tinnitus or vertigo. Otherwise, she has mild nasal congestion and thinks she may have a cold; she has not been irrigating despite recommendation. She has h/o chronic sinusitis without polyposis s/p 4 prior endoscopic sinus surgeries in past, most recently 2003. On exam, the right PE tube is in place and widely patent with an aerated ME space. Nasal endoscopy shows diffuse mild sinonasal mucosal edema with thick mucus in the left maxillary sinus, suctioned, and scattered thin mucus in the right nasal cavity suctioned, but no mucopus or polyps.\par Ears: the tube is in place and hearing is improved; can start w hearing aids and will get audiogram later this week to ensure closure of air-bone gap.\par Nose: there is a rhinitis today with recirculation mucus, but no e/o infection. Again reiterated importance of nasal rinses - to start them daily. RTO 1 month.

## 2020-02-14 ENCOUNTER — APPOINTMENT (OUTPATIENT)
Dept: OTOLARYNGOLOGY | Facility: CLINIC | Age: 80
End: 2020-02-14
Payer: MEDICARE

## 2020-02-14 PROCEDURE — 92557 COMPREHENSIVE HEARING TEST: CPT

## 2020-02-14 PROCEDURE — 92550 TYMPANOMETRY & REFLEX THRESH: CPT

## 2020-06-25 ENCOUNTER — APPOINTMENT (OUTPATIENT)
Dept: OTOLARYNGOLOGY | Facility: CLINIC | Age: 80
End: 2020-06-25
Payer: SELF-PAY

## 2020-06-25 ENCOUNTER — APPOINTMENT (OUTPATIENT)
Dept: OTOLARYNGOLOGY | Facility: CLINIC | Age: 80
End: 2020-06-25
Payer: MEDICARE

## 2020-06-25 VITALS — HEART RATE: 85 BPM | BODY MASS INDEX: 22.58 KG/M2 | WEIGHT: 149 LBS | HEIGHT: 68 IN | TEMPERATURE: 98 F

## 2020-06-25 PROCEDURE — 99213 OFFICE O/P EST LOW 20 MIN: CPT | Mod: 25

## 2020-06-25 PROCEDURE — 31231 NASAL ENDOSCOPY DX: CPT

## 2020-06-25 PROCEDURE — V5299A: CUSTOM | Mod: NC

## 2020-06-25 NOTE — PHYSICAL EXAM
[Nasal Endoscopy Performed] : nasal endoscopy was performed, see procedure section for findings [Midline] : trachea located in midline position [de-identified] : mild posterior oropharyngeal cobblestoning [Normal] : no rashes [FreeTextEntry1] : Ad: eac clear and dry, PE tube in place and patent, ME clear\par As: eac clear and dry, TM intact and mobile, ME clear

## 2020-06-25 NOTE — DATA REVIEWED
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 SRT 65, 100% discrim, type B\par L ear: moderate SNHL. SRT 45, 100% discrim, type A\par

## 2020-06-25 NOTE — PROCEDURE
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 fontanelle patent, but natural os scarred and not connected to fontanelle. ethmoids, frontal and sphenoid widely patent. no polyps, scattered clear mucus\par L NC: absent middle turbinate. ethmoid, frontal and sphenoid widely patent, inferior meatal antrostomy, no polyps. patent nasal airway. \par ETO patent b/l, choana clear

## 2020-06-25 NOTE — ASSESSMENT
[FreeTextEntry1] : 79F here in followup. She is doing well since last seen 4 months prior. She has no acute complaints and feels well. Her hearing aids broke though and she would like them checked out. 6 months prior she underwent right PE tube placement for serous OM and has done well since - the right sided hearing remains improved and there is no otorrhea, otalgia, tinnitus or vertigo. She has chronic postnasal drip and does not really irrigate regularly. She has h/o chronic sinusitis without polyposis s/p 4 prior endoscopic sinus surgeries in past, most recently 2003.\par On exam, the right PE tube is in place and widely patent with an aerated ME space. Nasal endoscopy is stable with resolution of prior mucosal edema with patent paranasal sinuses and nasal airways.\par Ears: the tube is in place and hearing is improved; will send to audiologist to assess aids.\par Nose: scant recirculation mucus, but no e/o infection. Again reiterated importance of nasal rinses - to start them daily. RTO 4-6 months; can get auduio at that time. Doing well.

## 2020-06-25 NOTE — CONSULT LETTER
[Dear  ___] : Dear  [unfilled], [Courtesy Letter:] : I had the pleasure of seeing your patient, [unfilled], in my office today. [Sincerely,] : Sincerely, [Consult Closing:] : Thank you very much for allowing me to participate in the care of this patient.  If you have any questions, please do not hesitate to contact me. [DrAravind  ___] : Dr. GUIDO [Javi Knowles MD] : Javi Knowles MD  [Department of Otolaryngology, Head and Neck Surgery] : Department of Otolaryngology, Head and Neck Surgery [[facility]] : [facility]

## 2020-09-30 ENCOUNTER — APPOINTMENT (OUTPATIENT)
Dept: OTOLARYNGOLOGY | Facility: CLINIC | Age: 80
End: 2020-09-30
Payer: SELF-PAY

## 2020-09-30 ENCOUNTER — APPOINTMENT (OUTPATIENT)
Dept: OTOLARYNGOLOGY | Facility: CLINIC | Age: 80
End: 2020-09-30
Payer: MEDICARE

## 2020-09-30 PROCEDURE — 92550 TYMPANOMETRY & REFLEX THRESH: CPT

## 2020-09-30 PROCEDURE — 92557 COMPREHENSIVE HEARING TEST: CPT

## 2020-09-30 PROCEDURE — V5299A: CUSTOM | Mod: NC

## 2020-10-07 ENCOUNTER — APPOINTMENT (OUTPATIENT)
Dept: OTOLARYNGOLOGY | Facility: CLINIC | Age: 80
End: 2020-10-07
Payer: SELF-PAY

## 2020-10-07 PROCEDURE — V5261B: CUSTOM

## 2020-10-07 PROCEDURE — V5010 ASSESSMENT FOR HEARING AID: CPT

## 2020-10-29 ENCOUNTER — APPOINTMENT (OUTPATIENT)
Dept: OTOLARYNGOLOGY | Facility: CLINIC | Age: 80
End: 2020-10-29
Payer: MEDICARE

## 2020-10-29 PROCEDURE — 99214 OFFICE O/P EST MOD 30 MIN: CPT | Mod: 25

## 2020-10-29 PROCEDURE — V5299A: CUSTOM | Mod: NC

## 2020-10-29 PROCEDURE — 31231 NASAL ENDOSCOPY DX: CPT

## 2020-10-29 NOTE — ASSESSMENT
[FreeTextEntry1] : 80F here in followup. She is doing well since last seen 4 months prior with no acute complaints and feels well. She has since gotten new hearing aids and they work very well. Around 10 months ago she underwent right PE tube placement for serous OM and has done well since - the right sided hearing remains improved and there is no otorrhea, otalgia, tinnitus or vertigo. She has chronic postnasal drip and does not really irrigate regularly. She has h/o chronic sinusitis without polyposis s/p 4 prior endoscopic sinus surgeries in past, most recently 2003.\par On exam, the right PE tube is in place and widely patent with an aerated ME space. Nasal endoscopy is stable with resolution of prior mucosal edema with patent paranasal sinuses and nasal airways.\par Ears: the tube is in place and hearing is improved; will send to audiologist to assess aids.\par Nose: moderate recirculation mucus, but no e/o infection. Again reiterated importance of nasal rinses - to start them daily. RTO 4-6 months

## 2020-10-29 NOTE — PHYSICAL EXAM
[Nasal Endoscopy Performed] : nasal endoscopy was performed, see procedure section for findings [Midline] : trachea located in midline position [de-identified] : mild posterior oropharyngeal cobblestoning [Normal] : no rashes [FreeTextEntry1] : Ad: eac clear and dry, PE tube in place and patent, ME clear\par As: eac clear and dry, TM intact and mobile, ME clear

## 2020-10-29 NOTE — CONSULT LETTER
[Dear  ___] : Dear  [unfilled], [Courtesy Letter:] : I had the pleasure of seeing your patient, [unfilled], in my office today. [Consult Closing:] : Thank you very much for allowing me to participate in the care of this patient.  If you have any questions, please do not hesitate to contact me. [Sincerely,] : Sincerely, [DrAravind  ___] : Dr. GUIDO [Javi Knowles MD] : Javi Knowles MD  [Department of Otolaryngology, Head and Neck Surgery] : Department of Otolaryngology, Head and Neck Surgery [[facility]] : [facility]

## 2020-10-29 NOTE — PROCEDURE
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 fontanelle patent, but natural os scarred and not connected to fontanelle. ethmoids, frontal and sphenoid widely patent. no polyps, scattered clear mucus\par L NC: absent middle turbinate. ethmoid, frontal and sphenoid widely patent, inferior meatal antrostomy, no polyps. patent nasal airway. \par ETO patent b/l, choana clear

## 2020-10-29 NOTE — HISTORY OF PRESENT ILLNESS
[de-identified] : 80F here in followup.\par \par She is doing well since last seen 4 months prior. She has no acute complaints and feels well. She has since gotten new hearing aids and they work very well. The chronic postnasal drip remains.\par On 1/21/20 she underwent right M&T placement for serous OM and has done well since. Since then the right sided hearing remains improved and there is no drainage.\par She has had four prior endoscopic sinus surgeries - most recently in 2003. Since then, she has had no further surgeries and has done quite well. Her last sinus infection was nearly 2 years ago.\par \par ROS otherwise unchanged.

## 2020-10-29 NOTE — DATA REVIEWED
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 SRT 65, 100% discrim, type B\par L ear: moderate SNHL. SRT 45, 100% discrim, type A\par

## 2021-03-03 ENCOUNTER — NON-APPOINTMENT (OUTPATIENT)
Age: 81
End: 2021-03-03

## 2021-03-16 ENCOUNTER — APPOINTMENT (OUTPATIENT)
Dept: OTOLARYNGOLOGY | Facility: CLINIC | Age: 81
End: 2021-03-16
Payer: MEDICARE

## 2021-03-16 VITALS
DIASTOLIC BLOOD PRESSURE: 85 MMHG | HEART RATE: 65 BPM | TEMPERATURE: 95.4 F | SYSTOLIC BLOOD PRESSURE: 145 MMHG | BODY MASS INDEX: 22.43 KG/M2 | WEIGHT: 148 LBS | HEIGHT: 68 IN

## 2021-03-16 DIAGNOSIS — H65.91 UNSPECIFIED NONSUPPURATIVE OTITIS MEDIA, RIGHT EAR: ICD-10-CM

## 2021-03-16 PROCEDURE — 69200 CLEAR OUTER EAR CANAL: CPT

## 2021-03-16 PROCEDURE — 31231 NASAL ENDOSCOPY DX: CPT

## 2021-03-16 PROCEDURE — 99214 OFFICE O/P EST MOD 30 MIN: CPT | Mod: 25

## 2021-03-16 NOTE — DATA REVIEWED
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 SRT 65, 100% discrim, type B\par L ear: moderate SNHL. SRT 45, 100% discrim, type A\par

## 2021-03-16 NOTE — ASSESSMENT
[FreeTextEntry1] : 80F here in followup. She is doing well since last seen 5 months prior with no acute complaints and feels well. She does feel a bit more congested today though. She uses saline rinses occasionally. 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 She has chronic postnasal drip and does not really irrigate regularly. She has h/o chronic sinusitis without polyposis s/p 4 prior endoscopic sinus surgeries in past, most recently 2003.\par On exam, the right PE tube is extruded and was removed; the ME space though is aerated and the TM is intact. Nasal endoscopy is stable with mild mucosal edema and patent paranasal sinuses and nasal airways.\par Ears: the tube has extruded, but the ME cleft is aerated. For now, will watch so long as serous otitis does not recur.\par Nose: mild recirculation mucus and turbinate hypertrophy, but no e/o infection. Again reiterated importance of nasal hygiene and rinses. RTO 4-6 months

## 2021-03-16 NOTE — PROCEDURE
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 fontanelle patent, but natural os scarred and not connected to fontanelle. ethmoids, frontal and sphenoid widely patent. no polyps, scattered clear mucus\par L NC: absent middle turbinate. ethmoid, frontal and sphenoid widely patent, inferior meatal antrostomy, no polyps. patent nasal airway. \par ETO patent b/l, choana clear

## 2021-03-16 NOTE — PHYSICAL EXAM
[Nasal Endoscopy Performed] : nasal endoscopy was performed, see procedure section for findings [Midline] : trachea located in midline position [de-identified] : mild posterior oropharyngeal cobblestoning [Normal] : no rashes [FreeTextEntry1] : Ad: eac clear and dry, PE tube extruded sitting along canal floor, TM intact and hypomobile, ME clear\par As: eac clear and dry, TM intact and mobile, ME clear

## 2021-03-16 NOTE — CONSULT LETTER
[Dear  ___] : Dear  [unfilled], [Courtesy Letter:] : I had the pleasure of seeing your patient, [unfilled], in my office today. [Consult Closing:] : Thank you very much for allowing me to participate in the care of this patient.  If you have any questions, please do not hesitate to contact me. [Sincerely,] : Sincerely, [DrAravind  ___] : Dr. GUIDO [Javi Knowles MD] : Javi Knowles MD  [Department of Otolaryngology, Head and Neck Surgery] : Department of Otolaryngology, Head and Neck Surgery [[facility]] : [facility]

## 2021-03-16 NOTE — HISTORY OF PRESENT ILLNESS
[de-identified] : 80F here in followup.\par \par She is doing well since last seen 5 months prior. She has no acute complaints and feels well. She has since gotten new hearing aids and they work very well. The chronic postnasal drip remains and today she feels a bit more congested.\par On 1/21/20 she underwent right M&T placement for serous OM and has done well since. Since then the right sided hearing remains improved and there is no drainage.\par She has had four prior endoscopic sinus surgeries - most recently in 2003. Since then, she has had no further surgeries and has done quite well. Her last sinus infection was nearly 2 years ago.\par \par ROS otherwise unchanged.

## 2021-09-14 ENCOUNTER — APPOINTMENT (OUTPATIENT)
Dept: OTOLARYNGOLOGY | Facility: CLINIC | Age: 81
End: 2021-09-14
Payer: MEDICARE

## 2021-09-14 VITALS
BODY MASS INDEX: 22.43 KG/M2 | HEIGHT: 68 IN | HEART RATE: 90 BPM | TEMPERATURE: 97.3 F | WEIGHT: 148 LBS | SYSTOLIC BLOOD PRESSURE: 121 MMHG | DIASTOLIC BLOOD PRESSURE: 80 MMHG

## 2021-09-14 PROCEDURE — 31231 NASAL ENDOSCOPY DX: CPT

## 2021-09-14 PROCEDURE — 99214 OFFICE O/P EST MOD 30 MIN: CPT | Mod: 25

## 2021-09-14 NOTE — PHYSICAL EXAM
[Nasal Endoscopy Performed] : nasal endoscopy was performed, see procedure section for findings [Midline] : trachea located in midline position [de-identified] : mild posterior oropharyngeal cobblestoning [Normal] : no rashes [FreeTextEntry1] : Ad: eac clear and dry, TM intact and hypomobile w sclerosis, ME clear\par As: eac clear and dry, TM intact and mobile, ME clear

## 2021-09-14 NOTE — DATA REVIEWED
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 SRT 65, 100% discrim, type B\par L ear: moderate SNHL. SRT 45, 100% discrim, type A\par

## 2021-09-14 NOTE — CONSULT LETTER
[Dear  ___] : Dear  [unfilled], [Courtesy Letter:] : I had the pleasure of seeing your patient, [unfilled], in my office today. [Consult Closing:] : Thank you very much for allowing me to participate in the care of this patient.  If you have any questions, please do not hesitate to contact me. [Sincerely,] : Sincerely, [DrAravind  ___] : Dr. GUIDO [Javi Knowles MD] : Javi Knowles MD  [Department of Otolaryngology, Head and Neck Surgery] : Department of Otolaryngology, Head and Neck Surgery [[facility]] : [facility]

## 2021-09-14 NOTE — ASSESSMENT
[FreeTextEntry1] : 81F here in followup. She is doing well since last seen 6 months prior. She has no acute complaints and feels well. The chronic postnasal drip remains improved. She uses saline rinses occasionally. 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 She has chronic postnasal drip and does not really irrigate regularly. She has h/o chronic sinusitis without polyposis s/p 4 prior endoscopic sinus surgeries in past, most recently 2003.\par On exam, the right TM is well healed with an aerated middle ear. Nasal endoscopy is stable with mild mucosal edema and patent paranasal sinuses and nasal airways and mild right sided recirculation mucus.\par Ears: right middle ear remains aerated. For now, will watch so long as serous otitis does not recur.\par Nose: mild recirculation mucus and turbinate hypertrophy, but no e/o infection. Again reiterated importance of nasal hygiene and rinses. RTO 4-6 months

## 2021-09-14 NOTE — HISTORY OF PRESENT ILLNESS
[de-identified] : 81F here in followup.\par \par She is doing well since last seen 6 months prior. She has no acute complaints and feels well. The chronic postnasal drip remains improved.\par On 1/21/20 she underwent right M&T placement for serous OM and has done well since. Since then the right sided hearing remains improved and there is no drainage.\par She has had four prior endoscopic sinus surgeries - most recently in 2003. Since then, she has had no further surgeries and has done quite well. Her last sinus infection was nearly 2 years ago.\par \par ROS otherwise unchanged.

## 2022-03-11 ENCOUNTER — APPOINTMENT (OUTPATIENT)
Dept: OTOLARYNGOLOGY | Facility: CLINIC | Age: 82
End: 2022-03-11
Payer: MEDICARE

## 2022-03-11 VITALS
DIASTOLIC BLOOD PRESSURE: 92 MMHG | HEIGHT: 68 IN | BODY MASS INDEX: 22.43 KG/M2 | TEMPERATURE: 96.2 F | HEART RATE: 79 BPM | SYSTOLIC BLOOD PRESSURE: 146 MMHG | WEIGHT: 148 LBS

## 2022-03-11 PROCEDURE — 99214 OFFICE O/P EST MOD 30 MIN: CPT | Mod: 25

## 2022-03-11 PROCEDURE — 31231 NASAL ENDOSCOPY DX: CPT

## 2022-03-11 NOTE — CONSULT LETTER
[Dear  ___] : Dear  [unfilled], [Courtesy Letter:] : I had the pleasure of seeing your patient, [unfilled], in my office today. [Consult Closing:] : Thank you very much for allowing me to participate in the care of this patient.  If you have any questions, please do not hesitate to contact me. [Sincerely,] : Sincerely, [Javi Knowles MD] : Javi Knowles MD  [Department of Otolaryngology, Head and Neck Surgery] : Department of Otolaryngology, Head and Neck Surgery [[facility]] : [facility]

## 2022-03-21 NOTE — PHYSICAL EXAM
[Nasal Endoscopy Performed] : nasal endoscopy was performed, see procedure section for findings [Midline] : trachea located in midline position [Normal] : no rashes [de-identified] : mild posterior oropharyngeal cobblestoning [FreeTextEntry1] : Ad: eac clear and dry, TM intact and mobile w sclerosis, ME clear\par As: eac clear and dry, TM intact and mobile, ME clear

## 2022-03-21 NOTE — ASSESSMENT
[FreeTextEntry1] : 81F here in followup. She is doing very well since last seen 6 months prior. She has no acute complaints and feels well. The postnasal drip remains improved. She uses saline rinses occasionally and when she uses them, her nasal sx are better. 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 She has h/o chronic sinusitis without polyposis s/p 4 prior endoscopic sinus surgeries in past, most recently 2003.\par On exam, the right TM is well healed with an aerated middle ear. Nasal endoscopy is stable with mild mucosal edema and patent paranasal sinuses and nasal airways and moderate right sided recirculation mucus which was suctioned.\par Ears: right middle ear remains aerated. For now, will watch so long as serous otitis does not recur. Cont w hearing aids.\par Nose: recirculation mucus and turbinate hypertrophy, but no e/o infection. Again reiterated importance of nasal hygiene and rinses. RTO 6 months

## 2022-03-21 NOTE — HISTORY OF PRESENT ILLNESS
[de-identified] : 81F here in followup.\par \par She is doing well since last seen 6 months prior. She has no acute complaints and feels well. The postnasal drip remains improved. She is using saline rinses as needed; when she does them, her drip and mucus are improved.\par On 1/21/20 she underwent right M&T placement for serous OM and has done well since. Since then the right sided hearing remains improved and there is no drainage.\par She has had four prior endoscopic sinus surgeries - most recently in 2003. Since then, she has had no further surgeries and has done quite well. Her last sinus infection was nearly 2 years ago.\par \par ROS otherwise unchanged.

## 2022-03-21 NOTE — PROCEDURE
[FreeTextEntry3] : Nasal Endoscopy:\par nasal airways patent, no mucosal edema\par anterior septal perforation, dry\par turbinate hypertrophy\par R NC: s/p near complete middle turbinectomy, prior inferior meatal antrostomy; moderate thick recirculation mucus behind residual uncinate suctioned. fontanelle patent, but natural os scarred and not connected to fontanelle. ethmoids, frontal and sphenoid widely patent. no polyps, scattered clear mucus\par L NC: absent middle turbinate. ethmoid, frontal and sphenoid widely patent, inferior meatal antrostomy, no polyps. patent nasal airway. \par ETO patent b/l, choana clear

## 2022-03-21 NOTE — DATA REVIEWED
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 SRT 65, 100% discrim, type B\par L ear: moderate SNHL. SRT 45, 100% discrim, type A\par

## 2022-05-16 ENCOUNTER — EMERGENCY (EMERGENCY)
Facility: HOSPITAL | Age: 82
LOS: 1 days | Discharge: ANOTHER HOSPITAL | End: 2022-05-16
Attending: EMERGENCY MEDICINE | Admitting: EMERGENCY MEDICINE
Payer: MEDICARE

## 2022-05-16 VITALS
WEIGHT: 184.97 LBS | SYSTOLIC BLOOD PRESSURE: 146 MMHG | HEART RATE: 89 BPM | TEMPERATURE: 98 F | OXYGEN SATURATION: 100 % | DIASTOLIC BLOOD PRESSURE: 89 MMHG | RESPIRATION RATE: 18 BRPM

## 2022-05-16 VITALS
HEART RATE: 116 BPM | TEMPERATURE: 98 F | SYSTOLIC BLOOD PRESSURE: 126 MMHG | RESPIRATION RATE: 18 BRPM | OXYGEN SATURATION: 95 % | DIASTOLIC BLOOD PRESSURE: 78 MMHG

## 2022-05-16 DIAGNOSIS — Z90.13 ACQUIRED ABSENCE OF BILATERAL BREASTS AND NIPPLES: Chronic | ICD-10-CM

## 2022-05-16 LAB
ALBUMIN SERPL ELPH-MCNC: 3.3 G/DL — LOW (ref 3.4–5)
ALP SERPL-CCNC: 104 U/L — SIGNIFICANT CHANGE UP (ref 40–120)
ALT FLD-CCNC: 39 U/L — SIGNIFICANT CHANGE UP (ref 12–42)
ANION GAP SERPL CALC-SCNC: 10 MMOL/L — SIGNIFICANT CHANGE UP (ref 9–16)
APPEARANCE UR: CLEAR — SIGNIFICANT CHANGE UP
APTT BLD: 31.4 SEC — SIGNIFICANT CHANGE UP (ref 27.5–35.5)
AST SERPL-CCNC: 41 U/L — HIGH (ref 15–37)
BASOPHILS # BLD AUTO: 0.06 K/UL — SIGNIFICANT CHANGE UP (ref 0–0.2)
BASOPHILS NFR BLD AUTO: 0.5 % — SIGNIFICANT CHANGE UP (ref 0–2)
BILIRUB SERPL-MCNC: 0.8 MG/DL — SIGNIFICANT CHANGE UP (ref 0.2–1.2)
BILIRUB UR-MCNC: NEGATIVE — SIGNIFICANT CHANGE UP
BUN SERPL-MCNC: 29 MG/DL — HIGH (ref 7–23)
CALCIUM SERPL-MCNC: 8.8 MG/DL — SIGNIFICANT CHANGE UP (ref 8.5–10.5)
CHLORIDE SERPL-SCNC: 104 MMOL/L — SIGNIFICANT CHANGE UP (ref 96–108)
CO2 SERPL-SCNC: 25 MMOL/L — SIGNIFICANT CHANGE UP (ref 22–31)
COLOR SPEC: YELLOW — SIGNIFICANT CHANGE UP
CREAT SERPL-MCNC: 0.81 MG/DL — SIGNIFICANT CHANGE UP (ref 0.5–1.3)
DIFF PNL FLD: NEGATIVE — SIGNIFICANT CHANGE UP
EGFR: 74 ML/MIN/1.73M2 — SIGNIFICANT CHANGE UP
EOSINOPHIL # BLD AUTO: 0.12 K/UL — SIGNIFICANT CHANGE UP (ref 0–0.5)
EOSINOPHIL NFR BLD AUTO: 1 % — SIGNIFICANT CHANGE UP (ref 0–6)
GLUCOSE SERPL-MCNC: 116 MG/DL — HIGH (ref 70–99)
GLUCOSE UR QL: NEGATIVE — SIGNIFICANT CHANGE UP
HCT VFR BLD CALC: 26.9 % — LOW (ref 34.5–45)
HCT VFR BLD CALC: 39.9 % — SIGNIFICANT CHANGE UP (ref 34.5–45)
HGB BLD-MCNC: 12.7 G/DL — SIGNIFICANT CHANGE UP (ref 11.5–15.5)
HGB BLD-MCNC: 8.5 G/DL — LOW (ref 11.5–15.5)
IMM GRANULOCYTES NFR BLD AUTO: 1.5 % — SIGNIFICANT CHANGE UP (ref 0–1.5)
INR BLD: 1.37 — HIGH (ref 0.88–1.16)
KETONES UR-MCNC: 15 MG/DL
LEUKOCYTE ESTERASE UR-ACNC: NEGATIVE — SIGNIFICANT CHANGE UP
LYMPHOCYTES # BLD AUTO: 1.24 K/UL — SIGNIFICANT CHANGE UP (ref 1–3.3)
LYMPHOCYTES # BLD AUTO: 10.7 % — LOW (ref 13–44)
MCHC RBC-ENTMCNC: 31.4 PG — SIGNIFICANT CHANGE UP (ref 27–34)
MCHC RBC-ENTMCNC: 31.6 GM/DL — LOW (ref 32–36)
MCHC RBC-ENTMCNC: 31.8 GM/DL — LOW (ref 32–36)
MCHC RBC-ENTMCNC: 32.1 PG — SIGNIFICANT CHANGE UP (ref 27–34)
MCV RBC AUTO: 101.5 FL — HIGH (ref 80–100)
MCV RBC AUTO: 98.8 FL — SIGNIFICANT CHANGE UP (ref 80–100)
MONOCYTES # BLD AUTO: 0.81 K/UL — SIGNIFICANT CHANGE UP (ref 0–0.9)
MONOCYTES NFR BLD AUTO: 7 % — SIGNIFICANT CHANGE UP (ref 2–14)
NEUTROPHILS # BLD AUTO: 9.21 K/UL — HIGH (ref 1.8–7.4)
NEUTROPHILS NFR BLD AUTO: 79.3 % — HIGH (ref 43–77)
NITRITE UR-MCNC: NEGATIVE — SIGNIFICANT CHANGE UP
NRBC # BLD: 0 /100 WBCS — SIGNIFICANT CHANGE UP (ref 0–0)
NRBC # BLD: 0 /100 WBCS — SIGNIFICANT CHANGE UP (ref 0–0)
PH UR: 6 — SIGNIFICANT CHANGE UP (ref 5–8)
PLATELET # BLD AUTO: 142 K/UL — LOW (ref 150–400)
PLATELET # BLD AUTO: 164 K/UL — SIGNIFICANT CHANGE UP (ref 150–400)
POTASSIUM SERPL-MCNC: 4.8 MMOL/L — SIGNIFICANT CHANGE UP (ref 3.5–5.3)
POTASSIUM SERPL-SCNC: 4.8 MMOL/L — SIGNIFICANT CHANGE UP (ref 3.5–5.3)
PROT SERPL-MCNC: 7 G/DL — SIGNIFICANT CHANGE UP (ref 6.4–8.2)
PROT UR-MCNC: ABNORMAL MG/DL
PROTHROM AB SERPL-ACNC: 16.1 SEC — HIGH (ref 10.5–13.4)
RBC # BLD: 2.65 M/UL — LOW (ref 3.8–5.2)
RBC # BLD: 4.04 M/UL — SIGNIFICANT CHANGE UP (ref 3.8–5.2)
RBC # FLD: 14.6 % — HIGH (ref 10.3–14.5)
RBC # FLD: 14.8 % — HIGH (ref 10.3–14.5)
SARS-COV-2 RNA SPEC QL NAA+PROBE: SIGNIFICANT CHANGE UP
SODIUM SERPL-SCNC: 139 MMOL/L — SIGNIFICANT CHANGE UP (ref 132–145)
SP GR SPEC: 1.02 — SIGNIFICANT CHANGE UP (ref 1–1.03)
UROBILINOGEN FLD QL: 0.2 E.U./DL — SIGNIFICANT CHANGE UP
WBC # BLD: 11.61 K/UL — HIGH (ref 3.8–10.5)
WBC # BLD: 12.53 K/UL — HIGH (ref 3.8–10.5)
WBC # FLD AUTO: 11.61 K/UL — HIGH (ref 3.8–10.5)
WBC # FLD AUTO: 12.53 K/UL — HIGH (ref 3.8–10.5)

## 2022-05-16 PROCEDURE — 99291 CRITICAL CARE FIRST HOUR: CPT | Mod: FS

## 2022-05-16 PROCEDURE — 71045 X-RAY EXAM CHEST 1 VIEW: CPT | Mod: 26

## 2022-05-16 PROCEDURE — 72131 CT LUMBAR SPINE W/O DYE: CPT | Mod: 26,MH

## 2022-05-16 PROCEDURE — 72192 CT PELVIS W/O DYE: CPT | Mod: 26,MH

## 2022-05-16 PROCEDURE — 93010 ELECTROCARDIOGRAM REPORT: CPT

## 2022-05-16 RX ORDER — TRAMADOL HYDROCHLORIDE 50 MG/1
50 TABLET ORAL ONCE
Refills: 0 | Status: DISCONTINUED | OUTPATIENT
Start: 2022-05-16 | End: 2022-05-16

## 2022-05-16 RX ORDER — MORPHINE SULFATE 50 MG/1
4 CAPSULE, EXTENDED RELEASE ORAL ONCE
Refills: 0 | Status: DISCONTINUED | OUTPATIENT
Start: 2022-05-16 | End: 2022-05-16

## 2022-05-16 RX ORDER — SODIUM CHLORIDE 9 MG/ML
1000 INJECTION, SOLUTION INTRAVENOUS ONCE
Refills: 0 | Status: COMPLETED | OUTPATIENT
Start: 2022-05-16 | End: 2022-05-16

## 2022-05-16 RX ORDER — PROTHROMBIN COMPLEX CONCENTRATE (HUMAN) 25.5; 16.5; 24; 22; 22; 26 [IU]/ML; [IU]/ML; [IU]/ML; [IU]/ML; [IU]/ML; [IU]/ML
2000 POWDER, FOR SOLUTION INTRAVENOUS ONCE
Refills: 0 | Status: COMPLETED | OUTPATIENT
Start: 2022-05-16 | End: 2022-05-16

## 2022-05-16 RX ORDER — PROTHROMBIN COMPLEX CONCENTRATE (HUMAN) 25.5; 16.5; 24; 22; 22; 26 [IU]/ML; [IU]/ML; [IU]/ML; [IU]/ML; [IU]/ML; [IU]/ML
1500 POWDER, FOR SOLUTION INTRAVENOUS ONCE
Refills: 0 | Status: DISCONTINUED | OUTPATIENT
Start: 2022-05-16 | End: 2022-05-16

## 2022-05-16 RX ADMIN — SODIUM CHLORIDE 1000 MILLILITER(S): 9 INJECTION, SOLUTION INTRAVENOUS at 22:52

## 2022-05-16 RX ADMIN — MORPHINE SULFATE 4 MILLIGRAM(S): 50 CAPSULE, EXTENDED RELEASE ORAL at 19:06

## 2022-05-16 RX ADMIN — PROTHROMBIN COMPLEX CONCENTRATE (HUMAN) 400 INTERNATIONAL UNIT(S): 25.5; 16.5; 24; 22; 22; 26 POWDER, FOR SOLUTION INTRAVENOUS at 22:52

## 2022-05-16 RX ADMIN — TRAMADOL HYDROCHLORIDE 50 MILLIGRAM(S): 50 TABLET ORAL at 17:45

## 2022-05-16 NOTE — ED ADULT NURSE REASSESSMENT NOTE - NS ED NURSE REASSESS COMMENT FT1
Pt CT results reveal multiple pelvic fx with multiple intramuscular hematomas. On arrival back from CT pt presents hypotensive SBP 94 and tachycardic 110's HR. Pt denies dizziness but appear pale and reports feeling cold. MIRIAM Guzman arranging tier one transfer to [place]. LR bolus, K centra to be administered. Perdue to be placed.

## 2022-05-16 NOTE — ED PROVIDER NOTE - CLINICAL SUMMARY MEDICAL DECISION MAKING FREE TEXT BOX
Patient PMHX AF on eliquis presents with lower back and buttock pain after mechanical trip and fall on uneven pavement. no head trauma, LOC, unable to weight bear after injury will check labs, do xrays, CT, give analgesics and continue to monitor

## 2022-05-16 NOTE — ED ADULT NURSE NOTE - OBJECTIVE STATEMENT
Pt presents to ED complaining of L hip pain sp fall. Negative HS, LOC. Hx of afib on eliquis. demonstrates ability to bends knees with pain. Unable to ambulate on arrival to ED.

## 2022-05-16 NOTE — ED PROVIDER NOTE - NSICDXPASTMEDICALHX_GEN_ALL_CORE_FT
PAST MEDICAL HISTORY:  No pertinent past medical history PAST MEDICAL HISTORY:  Atrial fibrillation     Breast cancer     HLD (hyperlipidemia)     HTN (hypertension)

## 2022-05-16 NOTE — ED PROVIDER NOTE - CARE PLAN
Principal Discharge DX:	Fall   1 Principal Discharge DX:	Pelvic fracture  Secondary Diagnosis:	Hemoperitoneum  Secondary Diagnosis:	Intramuscular hematoma  Secondary Diagnosis:	Sacral fracture   Principal Discharge DX:	Pelvic fracture  Secondary Diagnosis:	Hemoperitoneum  Secondary Diagnosis:	Intramuscular hematoma  Secondary Diagnosis:	Sacral fracture  Secondary Diagnosis:	Hemorrhagic shock

## 2022-05-16 NOTE — ED PROVIDER NOTE - PROGRESS NOTE DETAILS
received s/o from day team pending CT. CT reveals 1.  Acute fractures of the left superior and inferior pubic ramus, left   sacral lesly, and left S2 transverse process.  2.  Intramuscular hematomas involving the left psoas, iliacus, internal   and external obturator.  3.  Mild left hemoperitoneum  pt reassessed and noted repeat BP (after one dose of IV morphine 4mg) 95/64mmHg, , pain adequately controlled, abd soft, NV intact, AxOx4, results and transfer discussed, given dose of Kcentra for eliquis reversal, hemodynamically guarded and will need STAT transfer to trauma center. pt desires to go to [facility]. case discussed trauma and ER attending, accepted to [place] [facility] medics here for transport, given drop in Hg with no fluid dilution, plan to start Prbcs, patient signed consent form and is aware, and amenable to transfusion, kcentra hung, patient alert and oriented x 3, lights and sirents to [place] where patient requests to go for trauma. received s/o from day team pending CT. CT reveals 1.  Acute fractures of the left superior and inferior pubic ramus, left   sacral lesly, and left S2 transverse process.  2.  Intramuscular hematomas involving the left psoas, iliacus, internal   and external obturator.  3.  Mild left hemoperitoneum  pt reassessed and noted repeat BP (after one dose of IV morphine 4mg) 95/64mmHg, , pain adequately controlled, abd soft, NV intact, AxOx4, suspecting hemorrhagic shock, 1L IVF ordered, will rpt cbc and consider PRBC transfusion. Results and transfer discussed, given dose of Kcentra for eliquis reversal, hemodynamically guarded and will need STAT transfer to trauma center. pt desires to go to [facility]. case discussed trauma and ER attending, accepted to [place] [facility]  - MIRIAM Peter

## 2022-05-16 NOTE — ED PROVIDER NOTE - ATTENDING CONTRIBUTION TO CARE
patient with hx of breast ca, on eloquis s/p fall with ct findings consistent with hemoperitoneum, pelvic fractures, and hematoma. had drop in bp and tachycardia, discussed with trauma at [place] per patient request, lights and niko for trauma to [place] accepted. decision made to start PRBCs given drop in bp, and elev hr, with drop in hg, Kcentra started as well    patient alert and oriented, coherent, able to signs consents, agree with documentation and management.

## 2022-05-16 NOTE — ED PROVIDER NOTE - OBJECTIVE STATEMENT
Patient PMHX AF on eliquis, BIB EMS for lower L back pan s/p trip and fall on uneven cement. denies LOC, dizziness, head trauma, chest pain, paresthesias, focal weakness. Patient PMHX AF on eliquis, BIB EMS for lower L back pan s/p trip and fall on uneven cement. denies LOC, dizziness, head trauma, chest pain, paresthesias, focal weakness. unable to weight bear due to pain Patient PMHx of breast CA s/p double mastectomy, s/p chemo, in remission since 2013, AF on eliquis, HTN, HLD, BIB EMS for lower L back pain s/p trip and fall on uneven cement. denies LOC, dizziness, head trauma, chest pain, paresthesias, focal weakness. unable to weight bear due to pain

## 2022-05-16 NOTE — ED PROVIDER NOTE - PHYSICAL EXAMINATION
superficial abrasion over R wrist and forearm, no active bleeding  TTP over sacral spine and L ischium, skin intact, no step off, no crepitus, pelvis stable

## 2022-05-16 NOTE — ED ADULT TRIAGE NOTE - NS ED NURSE DIRECT TO ROOM YN
Pt presents to discuss PT/OT orders at [facility].  Medications verified, no changes.  Denies known Latex allergy or symptoms of Latex sensitivity.     Yes

## 2022-05-18 DIAGNOSIS — Z20.822 CONTACT WITH AND (SUSPECTED) EXPOSURE TO COVID-19: ICD-10-CM

## 2022-05-18 DIAGNOSIS — S32.512A FRACTURE OF SUPERIOR RIM OF LEFT PUBIS, INITIAL ENCOUNTER FOR CLOSED FRACTURE: ICD-10-CM

## 2022-05-18 DIAGNOSIS — S32.9XXA FRACTURE OF UNSPECIFIED PARTS OF LUMBOSACRAL SPINE AND PELVIS, INITIAL ENCOUNTER FOR CLOSED FRACTURE: ICD-10-CM

## 2022-05-18 DIAGNOSIS — M54.50 LOW BACK PAIN, UNSPECIFIED: ICD-10-CM

## 2022-05-18 DIAGNOSIS — R57.8 OTHER SHOCK: ICD-10-CM
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
## 2022-05-18 DIAGNOSIS — Z90.13 ACQUIRED ABSENCE OF BILATERAL BREASTS AND NIPPLES: ICD-10-CM

## 2022-05-18 DIAGNOSIS — Z79.01 LONG TERM (CURRENT) USE OF ANTICOAGULANTS: ICD-10-CM

## 2022-05-18 DIAGNOSIS — I48.91 UNSPECIFIED ATRIAL FIBRILLATION: ICD-10-CM

## 2022-05-18 DIAGNOSIS — E78.5 HYPERLIPIDEMIA, UNSPECIFIED: ICD-10-CM
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
## 2022-05-18 DIAGNOSIS — I10 ESSENTIAL (PRIMARY) HYPERTENSION: ICD-10-CM

## 2022-05-18 DIAGNOSIS — S32.10XA UNSPECIFIED FRACTURE OF SACRUM, INITIAL ENCOUNTER FOR CLOSED FRACTURE: ICD-10-CM

## 2022-05-18 DIAGNOSIS — Z85.3 PERSONAL HISTORY OF MALIGNANT NEOPLASM OF BREAST: ICD-10-CM

## 2022-05-18 DIAGNOSIS — Z92.21 PERSONAL HISTORY OF ANTINEOPLASTIC CHEMOTHERAPY: ICD-10-CM

## 2022-05-18 LAB
CULTURE RESULTS: NO GROWTH — SIGNIFICANT CHANGE UP
SPECIMEN SOURCE: SIGNIFICANT CHANGE UP

## 2022-09-09 ENCOUNTER — APPOINTMENT (OUTPATIENT)
Dept: OTOLARYNGOLOGY | Facility: CLINIC | Age: 82
End: 2022-09-09

## 2022-09-09 PROBLEM — I10 ESSENTIAL (PRIMARY) HYPERTENSION: Chronic | Status: ACTIVE | Noted: 2022-05-16

## 2022-09-09 PROBLEM — I48.91 UNSPECIFIED ATRIAL FIBRILLATION: Chronic | Status: ACTIVE | Noted: 2022-05-16

## 2022-09-09 PROBLEM — C50.919 MALIGNANT NEOPLASM OF UNSPECIFIED SITE OF UNSPECIFIED FEMALE BREAST: Chronic | Status: ACTIVE | Noted: 2022-05-16

## 2022-09-09 PROBLEM — E78.5 HYPERLIPIDEMIA, UNSPECIFIED: Chronic | Status: ACTIVE | Noted: 2022-05-16

## 2022-09-09 PROCEDURE — 31231 NASAL ENDOSCOPY DX: CPT

## 2022-09-09 PROCEDURE — 99214 OFFICE O/P EST MOD 30 MIN: CPT | Mod: 25

## 2022-09-09 NOTE — DATA REVIEWED
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 SRT 65, 100% discrim, type B\par L ear: moderate SNHL. SRT 45, 100% discrim, type A\par

## 2022-09-09 NOTE — ASSESSMENT
[FreeTextEntry1] : 82F here in followup. Since last seen 6 months prior, she had a fall and was in rehab but is doing much better at this time and feels good. She has no acute complaints and feels well. The postnasal drip remains improved. She uses saline rinses occasionally and when she uses them, her nasal sx are better. 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 She has h/o chronic sinusitis without polyposis s/p 4 prior endoscopic sinus surgeries in past, most recently 2003.\par On exam, the right TM is well healed with an aerated middle ear. Nasal endoscopy is stable with mild mucosal edema and patent paranasal sinuses and nasal airways and moderate right sided recirculation mucus which was suctioned.\par Ears: right middle ear remains aerated. For now, will watch so long as serous otitis does not recur. Cont w hearing aids.\par Nose: recirculation mucus and turbinate hypertrophy, but no e/o infection. Again reiterated importance of nasal hygiene and rinses. RTO 6 months

## 2022-09-09 NOTE — HISTORY OF PRESENT ILLNESS
[de-identified] : 82F here in followup.\par \par Since last seen 6 months prior, she had a fall and was in rehab but is doing much better.\par She has no acute complaints and feels well. The postnasal drip remains improved. She is using saline rinses as needed; when she does them, her drip and mucus are improved.\par On 1/21/20 she underwent right M&T placement for serous OM and has done well since. Since then the right sided hearing remains improved and there is no drainage.\par She has had four prior endoscopic sinus surgeries - most recently in 2003. Since then, she has had no further surgeries and has done quite well. Her last sinus infection was nearly 2 years ago.\par \par ROS otherwise unchanged.

## 2022-09-09 NOTE — CONSULT LETTER
[Dear  ___] : Dear  [unfilled], [Courtesy Letter:] : I had the pleasure of seeing your patient, [unfilled], in my office today. [Consult Closing:] : Thank you very much for allowing me to participate in the care of this patient.  If you have any questions, please do not hesitate to contact me. [Sincerely,] : Sincerely, [Javi Knowles MD] : Javi Knowles MD  [Department of Otolaryngology, Head and Neck Surgery] : Department of Otolaryngology, Head and Neck Surgery [[facility]] : [facility]

## 2022-09-09 NOTE — PHYSICAL EXAM
[Nasal Endoscopy Performed] : nasal endoscopy was performed, see procedure section for findings [Midline] : trachea located in midline position [de-identified] : mild posterior oropharyngeal cobblestoning [Normal] : no rashes [FreeTextEntry1] : Ad: eac clear and dry, TM intact and mobile w sclerosis, ME clear\par As: eac clear and dry, TM intact and mobile, ME clear

## 2022-10-09 ENCOUNTER — NON-APPOINTMENT (OUTPATIENT)
Age: 82
End: 2022-10-09

## 2022-12-27 ENCOUNTER — APPOINTMENT (OUTPATIENT)
Dept: OTOLARYNGOLOGY | Facility: CLINIC | Age: 82
End: 2022-12-27

## 2022-12-27 PROCEDURE — 99214 OFFICE O/P EST MOD 30 MIN: CPT | Mod: 25

## 2022-12-27 PROCEDURE — 31231 NASAL ENDOSCOPY DX: CPT

## 2022-12-27 NOTE — PHYSICAL EXAM
[Nasal Endoscopy Performed] : nasal endoscopy was performed, see procedure section for findings [Midline] : trachea located in midline position [de-identified] : mild posterior oropharyngeal cobblestoning [Normal] : no rashes [FreeTextEntry1] : Ad: eac clear and dry, TM intact and mobile w sclerosis, ME clear no effusion\par As: eac clear and dry, TM intact and mobile, ME clear

## 2022-12-27 NOTE — HISTORY OF PRESENT ILLNESS
[de-identified] : 82F here in followup.\par \par Over the past few weeks she had developed thick green/yellow mucus with facial pressure and postnasal drip. She started augmentin and has now finished >1 week and feels marked improvement. She is here for evaluation.\par She is otherwise doing well. She is using saline rinses only rarely as needed; when she does them, her drip and mucus are improved.\par \par On 1/21/20 she underwent right M&T placement for serous OM and has done well since. Since then the right sided hearing remains improved and there is no drainage.\par She has had four prior endoscopic sinus surgeries - most recently in 2003. Since then, she has had no further surgeries and has done quite well. Her last sinus infection was nearly 2 years ago.\par \par ROS otherwise unchanged.

## 2022-12-27 NOTE — PROCEDURE
[FreeTextEntry3] : Nasal Endoscopy:\par nasal airways patent, no mucosal edema\par anterior septal perforation, dry\par turbinate hypertrophy\par R NC: s/p near complete middle turbinectomy, prior inferior meatal antrostomy; moderate thick white recirculation mucus behind residual uncinate suctioned. fontanelle patent, but natural os scarred and not connected to fontanelle. ethmoids, frontal and sphenoid widely patent. no polyps, scattered clear mucus. LEXA patent\par L NC: absent middle turbinate. ethmoid, frontal and sphenoid widely patent, inferior meatal antrostomy, no polyps. patent nasal airway. \par ETO patent b/l, choana clear

## 2022-12-27 NOTE — ASSESSMENT
[FreeTextEntry1] : 82F here in followup. Over the past few weeks she had developed thick green/yellow mucus with facial pressure and postnasal drip. She started augmentin and has now finished >1 week and feels marked improvement. She is here for evaluation. She is otherwise doing well. She is using saline rinses only rarely as needed; when she does them, her drip and mucus are improved. 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 She has h/o chronic sinusitis without polyposis s/p 4 prior endoscopic sinus surgeries in past, most recently 2003.\par On exam, the right TM is well healed with an aerated middle ear. Nasal endoscopy is stable with mild mucosal edema and patent paranasal sinuses and nasal airways and moderate right sided recirculation mucus which was suctioned. There is no mucopus nor sign of active infection today.\par -Ears: right middle ear remains aerated. For now, will watch so long as serous otitis does not recur. Cont w hearing aids.\par -Nose: recirculation mucus and turbinate hypertrophy, but no e/o infection. Again reiterated importance of nasal hygiene and rinses. Finish augmentin as prescribed. RTO 6 months

## 2022-12-27 NOTE — DATA REVIEWED
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 SRT 65, 100% discrim, type B\par L ear: moderate SNHL. SRT 45, 100% discrim, type A\par

## 2022-12-27 NOTE — CONSULT LETTER
[Dear  ___] : Dear  [unfilled], [Courtesy Letter:] : I had the pleasure of seeing your patient, [unfilled], in my office today. [Consult Closing:] : Thank you very much for allowing me to participate in the care of this patient.  If you have any questions, please do not hesitate to contact me. [Sincerely,] : Sincerely, [Javi Knowles MD] : Javi Knowles MD  [Department of Otolaryngology, Head and Neck Surgery] : Department of Otolaryngology, Head and Neck Surgery [[facility]] : [facility]

## 2023-03-10 ENCOUNTER — APPOINTMENT (OUTPATIENT)
Dept: OTOLARYNGOLOGY | Facility: CLINIC | Age: 83
End: 2023-03-10
Payer: MEDICARE

## 2023-03-10 VITALS
DIASTOLIC BLOOD PRESSURE: 103 MMHG | HEART RATE: 92 BPM | TEMPERATURE: 86.9 F | WEIGHT: 130 LBS | SYSTOLIC BLOOD PRESSURE: 134 MMHG | HEIGHT: 67 IN | BODY MASS INDEX: 20.4 KG/M2

## 2023-03-10 PROCEDURE — 31231 NASAL ENDOSCOPY DX: CPT

## 2023-03-10 PROCEDURE — 99213 OFFICE O/P EST LOW 20 MIN: CPT | Mod: 25

## 2023-03-10 NOTE — PROCEDURE
[FreeTextEntry3] : Nasal Endoscopy:\par nasal airways patent, no mucosal edema\par anterior septal perforation, dry\par turbinate hypertrophy\par R NC: s/p near complete middle turbinectomy, prior inferior meatal antrostomy; mild thick white recirculation mucus behind residual uncinate suctioned. fontanelle patent, but natural os scarred and not connected to fontanelle. ethmoids, frontal and sphenoid widely patent. no polyps, scattered clear mucus. LEXA patent\par L NC: absent middle turbinate. ethmoid, frontal and sphenoid widely patent, inferior meatal antrostomy, no polyps. patent nasal airway. \par ETO patent b/l, choana clear

## 2023-03-10 NOTE — CONSULT LETTER
[Dear  ___] : Dear  [unfilled], [Courtesy Letter:] : I had the pleasure of seeing your patient, [unfilled], in my office today. [Consult Closing:] : Thank you very much for allowing me to participate in the care of this patient.  If you have any questions, please do not hesitate to contact me. [Sincerely,] : Sincerely, [Javi Knowles MD] : Javi Knowles MD  [Department of Otolaryngology, Head and Neck Surgery] : Department of Otolaryngology, Head and Neck Surgery [[facility]] : [facility]

## 2023-03-10 NOTE — DATA REVIEWED
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 SRT 65, 100% discrim, type B\par L ear: moderate SNHL. SRT 45, 100% discrim, type A\par

## 2023-03-10 NOTE — ASSESSMENT
[FreeTextEntry1] : 82F here in followup. She is doing well since last seen 3 months ago. Prior to last visit she was treated with augmentin for an acute (on chronic) sinusitis in setting of thick green/yellow mucus with facial pressure and postnasal drip which remains resolved. She is using saline rinses only rarely as needed; when she does them, her drip and mucus are improved. 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 She has h/o chronic sinusitis without polyposis s/p 4 prior endoscopic sinus surgeries in past, most recently 2003.\par On exam, the right TM is well healed with an aerated middle ear. Nasal endoscopy is stable with mild mucosal edema and patent paranasal sinuses and nasal airways and mild right sided recirculation mucus which was suctioned. There is no mucopus nor sign of active infection today.\par -Ears: right middle ear remains aerated. For now, will watch so long as serous otitis does not recur. Continue with hearing aids.\par -Nose: recirculation mucus and turbinate hypertrophy, but no e/o infection. Again reiterated importance of nasal hygiene and rinses. RTO 6 months

## 2023-03-10 NOTE — HISTORY OF PRESENT ILLNESS
[de-identified] : 82F here in followup.\par \par She is doing well since last seen 3 months ago. \par Prior to last visit was treated with augmentin for sinusitis in setting of thick green/yellow mucus with facial pressure and postnasal drip which remains resolved.\par \par She is using saline rinses only rarely as needed; when she does them, her drip and mucus are improved.\par \par On 1/21/20 she underwent right M&T placement for serous OM and has done well since. Since then the right sided hearing remains improved and there is no drainage.\par She has had four prior endoscopic sinus surgeries - most recently in 2003. Since then, she has had no further surgeries and has done quite well. Her last sinus infection was nearly 2 years ago.\par \par ROS otherwise unchanged.

## 2023-03-10 NOTE — PHYSICAL EXAM
[Nasal Endoscopy Performed] : nasal endoscopy was performed, see procedure section for findings [Midline] : trachea located in midline position [de-identified] : mild posterior oropharyngeal cobblestoning [Normal] : no rashes [FreeTextEntry1] : Ad: eac clear and dry, TM intact and mobile w sclerosis, ME clear no effusion\par As: eac clear and dry, TM intact and mobile, ME clear

## 2023-08-08 ENCOUNTER — APPOINTMENT (OUTPATIENT)
Dept: OTOLARYNGOLOGY | Facility: CLINIC | Age: 83
End: 2023-08-08
Payer: MEDICARE

## 2023-08-08 DIAGNOSIS — T16.2XXA FOREIGN BODY IN LEFT EAR, INITIAL ENCOUNTER: ICD-10-CM

## 2023-08-08 PROCEDURE — 99211 OFF/OP EST MAY X REQ PHY/QHP: CPT | Mod: 25

## 2023-08-08 PROCEDURE — 69200 CLEAR OUTER EAR CANAL: CPT | Mod: LT

## 2023-08-08 NOTE — HISTORY OF PRESENT ILLNESS
[de-identified] : 83F here in followup.  Over the weekend she got a piece of her hearing aid stuck in the left ear. She thinks she pushed it in more while trying to remove it. There is no otorrhea or otalgia. She is otherwise doing well since last seen 5 months ago.  ROS otherwise unchanged.

## 2023-08-08 NOTE — ASSESSMENT
[FreeTextEntry1] : 83F here in followup. Over the weekend she got a piece of her hearing aid stuck in the left ear. She thinks she pushed it in more while trying to remove it. There is no otorrhea or otalgia. She is otherwise doing well since last seen 5 months ago. On exam, a silastic hearing aid bud was stuck in the left EAC, removed w alligator. -left EAC FB, now removed. nothing further to do -f/u scheduled time for her various other ENT issues I see her for

## 2023-08-08 NOTE — CONSULT LETTER
[Dear  ___] : Dear  [unfilled], [Courtesy Letter:] : I had the pleasure of seeing your patient, [unfilled], in my office today. [Consult Closing:] : Thank you very much for allowing me to participate in the care of this patient.  If you have any questions, please do not hesitate to contact me. [Sincerely,] : Sincerely, [Javi Knowles MD] : Javi Knowlse MD  [Department of Otolaryngology, Head and Neck Surgery] : Department of Otolaryngology, Head and Neck Surgery [[facility]] : [facility]

## 2023-08-08 NOTE — PHYSICAL EXAM
[Normal] : no rashes [FreeTextEntry1] : Ad: eac clear and dry, TM intact and mobile w sclerosis, ME clear no effusion As: eac w silastic hearing aid bud lodged in canal, removed w alligator. TM intact and mobile, ME clear

## 2023-08-08 NOTE — DATA REVIEWED
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 SRT 65, 100% discrim, type B\par  L ear: moderate SNHL. SRT 45, 100% discrim, type A\par

## 2023-08-11 ENCOUNTER — APPOINTMENT (OUTPATIENT)
Dept: OTOLARYNGOLOGY | Facility: CLINIC | Age: 83
End: 2023-08-11
Payer: MEDICARE

## 2023-08-11 PROCEDURE — V5299A: CUSTOM

## 2023-09-08 ENCOUNTER — APPOINTMENT (OUTPATIENT)
Dept: OTOLARYNGOLOGY | Facility: CLINIC | Age: 83
End: 2023-09-08
Payer: MEDICARE

## 2023-09-08 VITALS
BODY MASS INDEX: 20.4 KG/M2 | WEIGHT: 130 LBS | DIASTOLIC BLOOD PRESSURE: 84 MMHG | HEIGHT: 67 IN | SYSTOLIC BLOOD PRESSURE: 122 MMHG | HEART RATE: 83 BPM | TEMPERATURE: 95.9 F

## 2023-09-08 PROCEDURE — 99213 OFFICE O/P EST LOW 20 MIN: CPT | Mod: 25

## 2023-09-08 PROCEDURE — 31231 NASAL ENDOSCOPY DX: CPT

## 2023-09-08 PROCEDURE — 92557 COMPREHENSIVE HEARING TEST: CPT

## 2023-09-08 PROCEDURE — 92550 TYMPANOMETRY & REFLEX THRESH: CPT | Mod: 52

## 2023-09-08 PROCEDURE — V5299A: CUSTOM | Mod: NC

## 2023-09-08 NOTE — HISTORY OF PRESENT ILLNESS
[de-identified] : 12F here in followup.  She is doing well since last seen.  She is using saline rinses only rarely as needed; when she does them, her drip and mucus are improved. On 1/21/20 she underwent right M&T placement for serous OM and has done well since. Since then the right sided hearing remains improved and there is no drainage. She has had four prior endoscopic sinus surgeries - most recently in 2003. Since then, she has had no further surgeries and has done quite well. Her last sinus infection was around 1 year ago and only 2 infections in 3 years.  ROS otherwise unchanged.

## 2023-09-08 NOTE — DATA REVIEWED
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 SRT 65, 100% discrim, type B\par  L ear: moderate SNHL. SRT 45, 100% discrim, type A\par

## 2023-09-08 NOTE — ASSESSMENT
[FreeTextEntry1] : 83F here in followup. She is doing well since last seen. She is using saline rinses only rarely as needed; when she does them, her drip and mucus are improved. 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 She has h/o chronic sinusitis without polyposis s/p 4 prior endoscopic sinus surgeries in past, most recently 2003. On exam, the right TM is well healed, hypomobile, with an aerated middle ear. Nasal endoscopy is stable with mild mucosal edema and patent paranasal sinuses and nasal airways and mild bilateral sided recirculation mucus which was suctioned. There is no mucopus nor sign of active infection today. -Ears: right middle ear remains aerated. For now, will watch so long as serous otitis does not recur. Continue with hearing aids. -Nose: recirculation mucus and turbinate hypertrophy, but no e/o infection. Again reiterated importance of nasal hygiene and rinses. RTO 6 months

## 2023-09-08 NOTE — CONSULT LETTER
[Dear  ___] : Dear  [unfilled], [Courtesy Letter:] : I had the pleasure of seeing your patient, [unfilled], in my office today. [Consult Closing:] : Thank you very much for allowing me to participate in the care of this patient.  If you have any questions, please do not hesitate to contact me. [Sincerely,] : Sincerely, [Javi Knowles MD] : Javi Knowles MD  [Department of Otolaryngology, Head and Neck Surgery] : Department of Otolaryngology, Head and Neck Surgery [[facility]] : [facility]

## 2023-09-08 NOTE — PROCEDURE
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 fontanelle patent, but natural os scarred and not connected to fontanelle. ethmoids, frontal and sphenoid widely patent. no polyps, scattered clear mucus. LEXA patent L NC: absent middle turbinate. ethmoid, frontal and sphenoid widely patent, inferior meatal antrostomy, no polyps. patent nasal airway. mild thick recirculation mucus removed ETO patent b/l, choana clear

## 2023-09-08 NOTE — PHYSICAL EXAM
[Nasal Endoscopy Performed] : nasal endoscopy was performed, see procedure section for findings [Midline] : trachea located in midline position [Normal] : no rashes [de-identified] : mild posterior oropharyngeal cobblestoning [FreeTextEntry1] : Ad: eac clear and dry, TM intact and hypomobile w sclerosis, ME clear no effusion As: eac clear and dry, TM intact and mobile, ME clear

## 2023-09-20 ENCOUNTER — APPOINTMENT (OUTPATIENT)
Dept: OTOLARYNGOLOGY | Facility: CLINIC | Age: 83
End: 2023-09-20
Payer: MEDICARE

## 2023-09-20 PROCEDURE — V5299A: CUSTOM | Mod: NC

## 2023-09-25 NOTE — HISTORY REVIEWED
Detail Level: Generalized Detail Level: Detailed [History reviewed] : History reviewed. [Medications and Allergies reviewed] : Medications and allergies reviewed.

## 2023-12-19 NOTE — ASSESSMENT
[FreeTextEntry1] : 78F here for routine f/u. She is doing well since last seen 6 months prior with no recent infections. She is not irrigating anymore, even though they helped her when she was on them. She had a cold last month and is still not back to normal yet, with mild congestion and sore throat.\par She has h/o chronic sinusitis without polyposis s/p 4 prior endoscopic sinus surgeries in past, most recently 2003. She has been using the neilmed irrigations more often and she feels they are helping - there is less postnasal drip and mucus. On endoscopy, there is a mild amount of clear thin recirculation mucus from behind the right uncinate, which was suctioned w relief. There is crusting in the right nasal cavity and dried mucosa, but there is no mucopus or polyps, nor any sinonasal mucosal edema; all paranasal sinuses are patent.\par This mucus buildup is constantly draining posteriorly causing her sx and is due to residual uncinates, prior surgery and scar blocking the natural maxillary os from the surgical antrostomy, despite very widely physically patent maxillary sinuses. This causes her URIs to last longer and persist.\par It was again stressed the need to be on regular neilmed rinses; this is the only way to clear the thick mucus and remove the nidus for infection. Also should use bacitracin to nasal vestibule twice daily for the dryness. There is no endoscopic e/o sinusitis today. RTO 4-6 months for routine drainage, or sooner should anything develop in the interim. Pt reassured.
complains of pain/discomfort

## 2024-03-04 ENCOUNTER — APPOINTMENT (OUTPATIENT)
Dept: OTOLARYNGOLOGY | Facility: CLINIC | Age: 84
End: 2024-03-04
Payer: MEDICARE

## 2024-03-04 DIAGNOSIS — J32.4 CHRONIC PANSINUSITIS: ICD-10-CM

## 2024-03-04 DIAGNOSIS — R09.82 POSTNASAL DRIP: ICD-10-CM

## 2024-03-04 PROCEDURE — 31231 NASAL ENDOSCOPY DX: CPT

## 2024-03-04 PROCEDURE — 99213 OFFICE O/P EST LOW 20 MIN: CPT | Mod: 25

## 2024-03-04 NOTE — PHYSICAL EXAM
[Nasal Endoscopy Performed] : nasal endoscopy was performed, see procedure section for findings [Midline] : trachea located in midline position [Normal] : orientation to person, place, and time: normal [de-identified] : mild posterior oropharyngeal cobblestoning [FreeTextEntry1] : Ad: eac clear and dry, TM intact and hypomobile w sclerosis, ME clear no effusion As: eac clear and dry, TM intact and mobile, ME clear

## 2024-03-04 NOTE — HISTORY OF PRESENT ILLNESS
[de-identified] : 83F here in followup.  She is doing well since last seen 6 months ago.  She is using saline rinses only rarely as needed; when she does them, her drip and mucus are improved. 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 Since then the right sided hearing remains improved and there is no drainage. She has had four prior endoscopic sinus surgeries - most recently in 2003. Since then, she has had no further surgeries and has done quite well. Her last sinus infection was >1 year ago and only 2 infections in 3-4 years.  ROS otherwise unchanged.

## 2024-03-04 NOTE — PHYSICAL EXAM
[Nasal Endoscopy Performed] : nasal endoscopy was performed, see procedure section for findings [Midline] : trachea located in midline position [Normal] : orientation to person, place, and time: normal [de-identified] : mild posterior oropharyngeal cobblestoning [FreeTextEntry1] : Ad: eac clear and dry, TM intact and hypomobile w sclerosis, ME clear no effusion As: eac clear and dry, TM intact and mobile, ME clear

## 2024-03-04 NOTE — DATA REVIEWED
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 SRT 65, 100% discrim, type B\par  L ear: moderate SNHL. SRT 45, 100% discrim, type A\par

## 2024-03-04 NOTE — PROCEDURE
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 fontanelle patent, but natural os scarred and not connected to fontanelle. ethmoids, frontal and sphenoid widely patent. no polyps, scattered clear mucus suctioned. LEXA patent L NC: absent middle turbinate. ethmoid, frontal and sphenoid widely patent, inferior meatal antrostomy, no polyps. patent nasal airway. ETO patent b/l, choana clear

## 2024-03-04 NOTE — HISTORY OF PRESENT ILLNESS
[de-identified] : 83F here in followup.  She is doing well since last seen 6 months ago.  She is using saline rinses only rarely as needed; when she does them, her drip and mucus are improved. 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 Since then the right sided hearing remains improved and there is no drainage. She has had four prior endoscopic sinus surgeries - most recently in 2003. Since then, she has had no further surgeries and has done quite well. Her last sinus infection was >1 year ago and only 2 infections in 3-4 years.  ROS otherwise unchanged.

## 2024-03-04 NOTE — CONSULT LETTER
[Dear  ___] : Dear  [unfilled], [Courtesy Letter:] : I had the pleasure of seeing your patient, [unfilled], in my office today. [Consult Closing:] : Thank you very much for allowing me to participate in the care of this patient.  If you have any questions, please do not hesitate to contact me. [Sincerely,] : Sincerely, [Javi Knowles MD] : Javi Knowles MD  [Department of Otolaryngology, Head and Neck Surgery] : Department of Otolaryngology, Head and Neck Surgery [[facility]] : [facility]

## 2024-03-04 NOTE — CONSULT LETTER
[Dear  ___] : Dear  [unfilled], [Courtesy Letter:] : I had the pleasure of seeing your patient, [unfilled], in my office today. [Sincerely,] : Sincerely, [Consult Closing:] : Thank you very much for allowing me to participate in the care of this patient.  If you have any questions, please do not hesitate to contact me. [Department of Otolaryngology, Head and Neck Surgery] : Department of Otolaryngology, Head and Neck Surgery [Javi Knowles MD] : Javi Knowles MD  [[facility]] : [facility]

## 2024-03-04 NOTE — DATA REVIEWED
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 SRT 65, 100% discrim, type B\par  L ear: moderate SNHL. SRT 45, 100% discrim, type A\par

## 2024-03-04 NOTE — ASSESSMENT
[FreeTextEntry1] : 83F here in followup. She is doing well since last seen 6 months ago. She is using saline rinses only rarely as needed; when she does them, her drip and mucus are improved. There is no otorrhea, otalgia, tinnitus or vertigo. She has h/o chronic sinusitis without polyposis s/p 4 prior endoscopic sinus surgeries in past, most recently 2003. On exam, the right TM is well healed, hypomobile, with an aerated middle ear. Nasal endoscopy is stable with mild mucosal edema and patent paranasal sinuses and nasal airways and mild bilateral sided recirculation mucus which was suctioned. There is no mucopus nor sign of active infection today. -Ears: right middle ear remains aerated. For now, will watch so long as serous otitis does not recur. Continue with hearing aids. -Nose: recirculation mucus and turbinate hypertrophy, but no e/o infection. Again reiterated importance of nasal hygiene and rinses. RTO 6 months

## 2024-10-22 ENCOUNTER — APPOINTMENT (OUTPATIENT)
Dept: OTOLARYNGOLOGY | Facility: CLINIC | Age: 84
End: 2024-10-22

## 2024-12-21 NOTE — HISTORY REVIEWED
[History reviewed] : History reviewed. [Medications and Allergies reviewed] : Medications and allergies reviewed. Primary LTSC

## 2025-01-30 ENCOUNTER — APPOINTMENT (OUTPATIENT)
Dept: OTOLARYNGOLOGY | Facility: CLINIC | Age: 85
End: 2025-01-30
Payer: MEDICARE

## 2025-01-30 ENCOUNTER — NON-APPOINTMENT (OUTPATIENT)
Age: 85
End: 2025-01-30

## 2025-01-30 VITALS — WEIGHT: 129 LBS | BODY MASS INDEX: 20.25 KG/M2 | HEIGHT: 67 IN

## 2025-01-30 DIAGNOSIS — R09.82 POSTNASAL DRIP: ICD-10-CM

## 2025-01-30 DIAGNOSIS — J32.4 CHRONIC PANSINUSITIS: ICD-10-CM

## 2025-01-30 PROCEDURE — 31231 NASAL ENDOSCOPY DX: CPT

## 2025-01-30 PROCEDURE — 99213 OFFICE O/P EST LOW 20 MIN: CPT | Mod: 25

## 2025-01-30 RX ORDER — AZITHROMYCIN 250 MG/1
250 TABLET, FILM COATED ORAL
Qty: 1 | Refills: 0 | Status: ACTIVE | COMMUNITY
Start: 2025-01-30 | End: 1900-01-01

## 2025-07-29 ENCOUNTER — APPOINTMENT (OUTPATIENT)
Dept: OTOLARYNGOLOGY | Facility: CLINIC | Age: 85
End: 2025-07-29
Payer: MEDICARE

## 2025-07-29 VITALS — HEIGHT: 67 IN | BODY MASS INDEX: 19.93 KG/M2 | WEIGHT: 127 LBS

## 2025-07-29 DIAGNOSIS — R09.82 POSTNASAL DRIP: ICD-10-CM

## 2025-07-29 DIAGNOSIS — J32.4 CHRONIC PANSINUSITIS: ICD-10-CM

## 2025-07-29 PROCEDURE — 31231 NASAL ENDOSCOPY DX: CPT

## 2025-07-29 PROCEDURE — 99214 OFFICE O/P EST MOD 30 MIN: CPT | Mod: 25
